# Patient Record
Sex: MALE | Race: WHITE | NOT HISPANIC OR LATINO | URBAN - METROPOLITAN AREA
[De-identification: names, ages, dates, MRNs, and addresses within clinical notes are randomized per-mention and may not be internally consistent; named-entity substitution may affect disease eponyms.]

---

## 2017-05-17 ENCOUNTER — HOSPITAL ENCOUNTER (EMERGENCY)
Facility: HOSPITAL | Age: 42
Discharge: LEFT BEFORE DISCHARGE | End: 2017-05-17
Attending: EMERGENCY MEDICINE | Admitting: EMERGENCY MEDICINE

## 2017-05-17 ENCOUNTER — APPOINTMENT (OUTPATIENT)
Dept: GENERAL RADIOLOGY | Facility: HOSPITAL | Age: 42
End: 2017-05-17

## 2017-05-17 VITALS
OXYGEN SATURATION: 92 % | BODY MASS INDEX: 34.82 KG/M2 | RESPIRATION RATE: 16 BRPM | HEIGHT: 75 IN | HEART RATE: 88 BPM | TEMPERATURE: 98.3 F | DIASTOLIC BLOOD PRESSURE: 66 MMHG | SYSTOLIC BLOOD PRESSURE: 113 MMHG | WEIGHT: 280 LBS

## 2017-05-17 DIAGNOSIS — R79.89 ELEVATED LFTS: ICD-10-CM

## 2017-05-17 DIAGNOSIS — F14.10 COCAINE ABUSE (HCC): ICD-10-CM

## 2017-05-17 DIAGNOSIS — D72.829 LEUKOCYTOSIS, UNSPECIFIED TYPE: ICD-10-CM

## 2017-05-17 DIAGNOSIS — F11.10 HEROIN ABUSE (HCC): ICD-10-CM

## 2017-05-17 DIAGNOSIS — F10.10 ETOH ABUSE: Primary | ICD-10-CM

## 2017-05-17 LAB
ALBUMIN SERPL-MCNC: 4.4 G/DL (ref 3.2–4.8)
ALBUMIN/GLOB SERPL: 1.4 G/DL (ref 1.5–2.5)
ALP SERPL-CCNC: 130 U/L (ref 25–100)
ALT SERPL W P-5'-P-CCNC: 29 U/L (ref 7–40)
AMPHET+METHAMPHET UR QL: NEGATIVE
AMPHETAMINES UR QL: NEGATIVE
ANION GAP SERPL CALCULATED.3IONS-SCNC: 15 MMOL/L (ref 3–11)
AST SERPL-CCNC: 39 U/L (ref 0–33)
BARBITURATES UR QL SCN: NEGATIVE
BASOPHILS # BLD AUTO: 0.05 10*3/MM3 (ref 0–0.2)
BASOPHILS NFR BLD AUTO: 0.3 % (ref 0–1)
BENZODIAZ UR QL SCN: NEGATIVE
BILIRUB SERPL-MCNC: 1.9 MG/DL (ref 0.3–1.2)
BNP SERPL-MCNC: 19 PG/ML (ref 0–100)
BUN BLD-MCNC: 10 MG/DL (ref 9–23)
BUN/CREAT SERPL: 14.3 (ref 7–25)
BUPRENORPHINE SERPL-MCNC: NEGATIVE NG/ML
CALCIUM SPEC-SCNC: 9.4 MG/DL (ref 8.7–10.4)
CANNABINOIDS SERPL QL: NEGATIVE
CHLORIDE SERPL-SCNC: 101 MMOL/L (ref 99–109)
CO2 SERPL-SCNC: 19 MMOL/L (ref 20–31)
COCAINE UR QL: POSITIVE
CREAT BLD-MCNC: 0.7 MG/DL (ref 0.6–1.3)
DEPRECATED RDW RBC AUTO: 47.8 FL (ref 37–54)
EOSINOPHIL # BLD AUTO: 0.02 10*3/MM3 (ref 0.1–0.3)
EOSINOPHIL NFR BLD AUTO: 0.1 % (ref 0–3)
ERYTHROCYTE [DISTWIDTH] IN BLOOD BY AUTOMATED COUNT: 14 % (ref 11.3–14.5)
ETHANOL BLD-MCNC: 235 MG/DL (ref 0–10)
GFR SERPL CREATININE-BSD FRML MDRD: 123 ML/MIN/1.73
GLOBULIN UR ELPH-MCNC: 3.2 GM/DL
GLUCOSE BLD-MCNC: 139 MG/DL (ref 70–100)
HCT VFR BLD AUTO: 46.2 % (ref 38.9–50.9)
HGB BLD-MCNC: 16.3 G/DL (ref 13.1–17.5)
HOLD SPECIMEN: NORMAL
HOLD SPECIMEN: NORMAL
IMM GRANULOCYTES # BLD: 0.03 10*3/MM3 (ref 0–0.03)
IMM GRANULOCYTES NFR BLD: 0.2 % (ref 0–0.6)
INR PPP: 1.05
LIPASE SERPL-CCNC: 35 U/L (ref 6–51)
LYMPHOCYTES # BLD AUTO: 1.66 10*3/MM3 (ref 0.6–4.8)
LYMPHOCYTES NFR BLD AUTO: 11.4 % (ref 24–44)
MCH RBC QN AUTO: 32.9 PG (ref 27–31)
MCHC RBC AUTO-ENTMCNC: 35.3 G/DL (ref 32–36)
MCV RBC AUTO: 93.3 FL (ref 80–99)
METHADONE UR QL SCN: NEGATIVE
MONOCYTES # BLD AUTO: 1.01 10*3/MM3 (ref 0–1)
MONOCYTES NFR BLD AUTO: 6.9 % (ref 0–12)
NEUTROPHILS # BLD AUTO: 11.82 10*3/MM3 (ref 1.5–8.3)
NEUTROPHILS NFR BLD AUTO: 81.1 % (ref 41–71)
OPIATES UR QL: POSITIVE
OXYCODONE UR QL SCN: NEGATIVE
PCP UR QL SCN: NEGATIVE
PLATELET # BLD AUTO: 123 10*3/MM3 (ref 150–450)
PMV BLD AUTO: 10.2 FL (ref 6–12)
POTASSIUM BLD-SCNC: 3.5 MMOL/L (ref 3.5–5.5)
PROPOXYPH UR QL: NEGATIVE
PROT SERPL-MCNC: 7.6 G/DL (ref 5.7–8.2)
PROTHROMBIN TIME: 11.5 SECONDS (ref 9.6–11.5)
RBC # BLD AUTO: 4.95 10*6/MM3 (ref 4.2–5.76)
SODIUM BLD-SCNC: 135 MMOL/L (ref 132–146)
TRICYCLICS UR QL SCN: NEGATIVE
TROPONIN I SERPL-MCNC: 0 NG/ML (ref 0–0.07)
TROPONIN I SERPL-MCNC: 0 NG/ML (ref 0–0.07)
WBC NRBC COR # BLD: 14.59 10*3/MM3 (ref 3.5–10.8)
WHOLE BLOOD HOLD SPECIMEN: NORMAL
WHOLE BLOOD HOLD SPECIMEN: NORMAL

## 2017-05-17 PROCEDURE — 99284 EMERGENCY DEPT VISIT MOD MDM: CPT

## 2017-05-17 PROCEDURE — 83880 ASSAY OF NATRIURETIC PEPTIDE: CPT | Performed by: EMERGENCY MEDICINE

## 2017-05-17 PROCEDURE — 96361 HYDRATE IV INFUSION ADD-ON: CPT

## 2017-05-17 PROCEDURE — 83690 ASSAY OF LIPASE: CPT | Performed by: EMERGENCY MEDICINE

## 2017-05-17 PROCEDURE — 85025 COMPLETE CBC W/AUTO DIFF WBC: CPT | Performed by: EMERGENCY MEDICINE

## 2017-05-17 PROCEDURE — 80306 DRUG TEST PRSMV INSTRMNT: CPT | Performed by: EMERGENCY MEDICINE

## 2017-05-17 PROCEDURE — 96375 TX/PRO/DX INJ NEW DRUG ADDON: CPT

## 2017-05-17 PROCEDURE — 93005 ELECTROCARDIOGRAM TRACING: CPT | Performed by: EMERGENCY MEDICINE

## 2017-05-17 PROCEDURE — 25010000002 ONDANSETRON PER 1 MG: Performed by: EMERGENCY MEDICINE

## 2017-05-17 PROCEDURE — 85610 PROTHROMBIN TIME: CPT | Performed by: EMERGENCY MEDICINE

## 2017-05-17 PROCEDURE — 80307 DRUG TEST PRSMV CHEM ANLYZR: CPT | Performed by: EMERGENCY MEDICINE

## 2017-05-17 PROCEDURE — 84484 ASSAY OF TROPONIN QUANT: CPT

## 2017-05-17 PROCEDURE — 93005 ELECTROCARDIOGRAM TRACING: CPT

## 2017-05-17 PROCEDURE — 80053 COMPREHEN METABOLIC PANEL: CPT | Performed by: EMERGENCY MEDICINE

## 2017-05-17 PROCEDURE — 25010000002 LORAZEPAM PER 2 MG: Performed by: EMERGENCY MEDICINE

## 2017-05-17 PROCEDURE — 96374 THER/PROPH/DIAG INJ IV PUSH: CPT

## 2017-05-17 PROCEDURE — 71010 HC CHEST PA OR AP: CPT

## 2017-05-17 RX ORDER — ONDANSETRON 2 MG/ML
4 INJECTION INTRAMUSCULAR; INTRAVENOUS ONCE
Status: COMPLETED | OUTPATIENT
Start: 2017-05-17 | End: 2017-05-17

## 2017-05-17 RX ORDER — FAMOTIDINE 10 MG/ML
20 INJECTION, SOLUTION INTRAVENOUS ONCE
Status: COMPLETED | OUTPATIENT
Start: 2017-05-17 | End: 2017-05-17

## 2017-05-17 RX ORDER — OMEPRAZOLE 20 MG/1
40 CAPSULE, DELAYED RELEASE ORAL DAILY
COMMUNITY
End: 2017-09-11

## 2017-05-17 RX ORDER — CARBAMAZEPINE 200 MG/1
200 TABLET, EXTENDED RELEASE ORAL 2 TIMES DAILY
Qty: 18 TABLET | Refills: 0 | Status: SHIPPED | OUTPATIENT
Start: 2017-05-17 | End: 2017-09-11

## 2017-05-17 RX ORDER — SODIUM CHLORIDE 0.9 % (FLUSH) 0.9 %
10 SYRINGE (ML) INJECTION AS NEEDED
Status: DISCONTINUED | OUTPATIENT
Start: 2017-05-17 | End: 2017-05-17 | Stop reason: HOSPADM

## 2017-05-17 RX ORDER — LORAZEPAM 2 MG/ML
1 INJECTION INTRAMUSCULAR ONCE
Status: COMPLETED | OUTPATIENT
Start: 2017-05-17 | End: 2017-05-17

## 2017-05-17 RX ADMIN — ONDANSETRON 4 MG: 2 INJECTION INTRAMUSCULAR; INTRAVENOUS at 10:35

## 2017-05-17 RX ADMIN — FAMOTIDINE 20 MG: 10 INJECTION, SOLUTION INTRAVENOUS at 10:37

## 2017-05-17 RX ADMIN — SODIUM CHLORIDE 1000 ML: 9 INJECTION, SOLUTION INTRAVENOUS at 10:25

## 2017-05-17 RX ADMIN — LORAZEPAM 1 MG: 2 INJECTION, SOLUTION INTRAMUSCULAR; INTRAVENOUS at 10:39

## 2017-09-11 ENCOUNTER — OFFICE VISIT (OUTPATIENT)
Dept: FAMILY MEDICINE CLINIC | Facility: CLINIC | Age: 42
End: 2017-09-11

## 2017-09-11 VITALS
BODY MASS INDEX: 34.38 KG/M2 | RESPIRATION RATE: 18 BRPM | TEMPERATURE: 98.4 F | SYSTOLIC BLOOD PRESSURE: 126 MMHG | HEART RATE: 72 BPM | DIASTOLIC BLOOD PRESSURE: 84 MMHG | WEIGHT: 276.5 LBS | HEIGHT: 75 IN

## 2017-09-11 DIAGNOSIS — R20.2 PARESTHESIA OF SKIN: ICD-10-CM

## 2017-09-11 DIAGNOSIS — F10.20 ALCOHOLISM (HCC): ICD-10-CM

## 2017-09-11 DIAGNOSIS — I10 ESSENTIAL HYPERTENSION: ICD-10-CM

## 2017-09-11 DIAGNOSIS — Z76.89 ENCOUNTER TO ESTABLISH CARE WITH NEW DOCTOR: ICD-10-CM

## 2017-09-11 DIAGNOSIS — F31.60 BIPOLAR AFFECTIVE DISORDER, CURRENT EPISODE MIXED, CURRENT EPISODE SEVERITY UNSPECIFIED (HCC): ICD-10-CM

## 2017-09-11 DIAGNOSIS — I26.99 OTHER PULMONARY EMBOLISM WITHOUT ACUTE COR PULMONALE, UNSPECIFIED CHRONICITY (HCC): Primary | ICD-10-CM

## 2017-09-11 DIAGNOSIS — F41.9 ANXIETY: ICD-10-CM

## 2017-09-11 DIAGNOSIS — K21.9 GASTROESOPHAGEAL REFLUX DISEASE, ESOPHAGITIS PRESENCE NOT SPECIFIED: ICD-10-CM

## 2017-09-11 DIAGNOSIS — Z72.0 TOBACCO ABUSE: ICD-10-CM

## 2017-09-11 DIAGNOSIS — Z13.220 SCREENING FOR HYPERLIPIDEMIA: ICD-10-CM

## 2017-09-11 LAB
ALBUMIN SERPL-MCNC: 4.4 G/DL (ref 3.2–4.8)
ALBUMIN/GLOB SERPL: 1.7 G/DL (ref 1.5–2.5)
ALP SERPL-CCNC: 130 U/L (ref 25–100)
ALT SERPL-CCNC: 31 U/L (ref 7–40)
AST SERPL-CCNC: 25 U/L (ref 0–33)
BASOPHILS # BLD AUTO: 0.04 10*3/MM3 (ref 0–0.2)
BASOPHILS NFR BLD AUTO: 0.4 % (ref 0–1)
BILIRUB SERPL-MCNC: 0.7 MG/DL (ref 0.3–1.2)
BUN SERPL-MCNC: 15 MG/DL (ref 9–23)
BUN/CREAT SERPL: 18.8 (ref 7–25)
CALCIUM SERPL-MCNC: 10 MG/DL (ref 8.7–10.4)
CHLORIDE SERPL-SCNC: 104 MMOL/L (ref 99–109)
CHOLEST SERPL-MCNC: 173 MG/DL (ref 0–200)
CO2 SERPL-SCNC: 26 MMOL/L (ref 20–31)
CREAT SERPL-MCNC: 0.8 MG/DL (ref 0.6–1.3)
EOSINOPHIL # BLD AUTO: 0.42 10*3/MM3 (ref 0–0.3)
EOSINOPHIL NFR BLD AUTO: 4.7 % (ref 0–3)
ERYTHROCYTE [DISTWIDTH] IN BLOOD BY AUTOMATED COUNT: 13.7 % (ref 11.3–14.5)
GLOBULIN SER CALC-MCNC: 2.6 GM/DL
GLUCOSE SERPL-MCNC: 98 MG/DL (ref 70–100)
HCT VFR BLD AUTO: 52 % (ref 38.9–50.9)
HDLC SERPL-MCNC: 58 MG/DL (ref 40–60)
HGB BLD-MCNC: 17 G/DL (ref 13.1–17.5)
IMM GRANULOCYTES # BLD: 0.03 10*3/MM3 (ref 0–0.03)
IMM GRANULOCYTES NFR BLD: 0.3 % (ref 0–0.6)
LDLC SERPL CALC-MCNC: 80 MG/DL (ref 0–100)
LYMPHOCYTES # BLD AUTO: 1.8 10*3/MM3 (ref 0.6–4.8)
LYMPHOCYTES NFR BLD AUTO: 20 % (ref 24–44)
MCH RBC QN AUTO: 30.7 PG (ref 27–31)
MCHC RBC AUTO-ENTMCNC: 32.7 G/DL (ref 32–36)
MCV RBC AUTO: 94 FL (ref 80–99)
MONOCYTES # BLD AUTO: 0.84 10*3/MM3 (ref 0–1)
MONOCYTES NFR BLD AUTO: 9.3 % (ref 0–12)
NEUTROPHILS # BLD AUTO: 5.89 10*3/MM3 (ref 1.5–8.3)
NEUTROPHILS NFR BLD AUTO: 65.3 % (ref 41–71)
PLATELET # BLD AUTO: 174 10*3/MM3 (ref 150–450)
POTASSIUM SERPL-SCNC: 4.8 MMOL/L (ref 3.5–5.5)
PROT SERPL-MCNC: 7 G/DL (ref 5.7–8.2)
RBC # BLD AUTO: 5.53 10*6/MM3 (ref 4.2–5.76)
SODIUM SERPL-SCNC: 139 MMOL/L (ref 132–146)
TRIGL SERPL-MCNC: 175 MG/DL (ref 0–150)
VIT B12 SERPL-MCNC: 316 PG/ML (ref 211–911)
VLDLC SERPL CALC-MCNC: 35 MG/DL
WBC # BLD AUTO: 9.02 10*3/MM3 (ref 3.5–10.8)

## 2017-09-11 PROCEDURE — 99406 BEHAV CHNG SMOKING 3-10 MIN: CPT | Performed by: FAMILY MEDICINE

## 2017-09-11 PROCEDURE — 99204 OFFICE O/P NEW MOD 45 MIN: CPT | Performed by: FAMILY MEDICINE

## 2017-09-11 RX ORDER — LISINOPRIL 20 MG/1
TABLET ORAL
COMMUNITY
Start: 2017-08-29 | End: 2017-09-11 | Stop reason: SDUPTHER

## 2017-09-11 RX ORDER — PROPRANOLOL HYDROCHLORIDE 20 MG/1
20 TABLET ORAL 3 TIMES DAILY
Qty: 90 TABLET | Refills: 2 | Status: SHIPPED | OUTPATIENT
Start: 2017-09-11 | End: 2017-12-11 | Stop reason: SDUPTHER

## 2017-09-11 RX ORDER — HYDROXYZINE HYDROCHLORIDE 25 MG/1
TABLET, FILM COATED ORAL
COMMUNITY
Start: 2017-08-29 | End: 2017-09-11 | Stop reason: SDUPTHER

## 2017-09-11 RX ORDER — HYDROXYZINE HYDROCHLORIDE 25 MG/1
25 TABLET, FILM COATED ORAL EVERY 6 HOURS PRN
Qty: 120 TABLET | Refills: 2 | Status: SHIPPED | OUTPATIENT
Start: 2017-09-11 | End: 2018-04-30 | Stop reason: SDUPTHER

## 2017-09-11 RX ORDER — RANITIDINE 150 MG/1
TABLET ORAL
COMMUNITY
Start: 2017-08-29 | End: 2017-09-11 | Stop reason: SDUPTHER

## 2017-09-11 RX ORDER — VENLAFAXINE HYDROCHLORIDE 75 MG/1
75 CAPSULE, EXTENDED RELEASE ORAL DAILY
Qty: 30 CAPSULE | Refills: 2 | Status: SHIPPED | OUTPATIENT
Start: 2017-09-11 | End: 2017-12-11 | Stop reason: SDUPTHER

## 2017-09-11 RX ORDER — QUETIAPINE FUMARATE 50 MG/1
50 TABLET, FILM COATED ORAL NIGHTLY
Qty: 30 TABLET | Refills: 2 | Status: SHIPPED | OUTPATIENT
Start: 2017-09-11 | End: 2017-12-11 | Stop reason: SDUPTHER

## 2017-09-11 RX ORDER — RANITIDINE 150 MG/1
150 TABLET ORAL 2 TIMES DAILY
Qty: 60 TABLET | Refills: 2 | Status: SHIPPED | OUTPATIENT
Start: 2017-09-11 | End: 2017-12-11 | Stop reason: SDUPTHER

## 2017-09-11 RX ORDER — VENLAFAXINE HYDROCHLORIDE 75 MG/1
CAPSULE, EXTENDED RELEASE ORAL
COMMUNITY
Start: 2017-08-29 | End: 2017-09-11 | Stop reason: SDUPTHER

## 2017-09-11 RX ORDER — LISINOPRIL 20 MG/1
20 TABLET ORAL DAILY
Qty: 30 TABLET | Refills: 2 | Status: SHIPPED | OUTPATIENT
Start: 2017-09-11 | End: 2017-12-11 | Stop reason: SDUPTHER

## 2017-09-11 RX ORDER — QUETIAPINE FUMARATE 50 MG/1
TABLET, FILM COATED ORAL
COMMUNITY
Start: 2017-08-29 | End: 2017-09-11 | Stop reason: SDUPTHER

## 2017-09-11 NOTE — PATIENT INSTRUCTIONS
Go to the nearest ER or return to clinic if symptoms worsen, fever/chill develop      Hypertension  Hypertension is another name for high blood pressure. High blood pressure forces your heart to work harder to pump blood. A blood pressure reading has two numbers, which includes a higher number over a lower number (example: 110/72).  HOME CARE   · Have your blood pressure rechecked by your doctor.  · Only take medicine as told by your doctor. Follow the directions carefully. The medicine does not work as well if you skip doses. Skipping doses also puts you at risk for problems.  · Do not smoke.  · Monitor your blood pressure at home as told by your doctor.  GET HELP IF:  · You think you are having a reaction to the medicine you are taking.  · You have repeat headaches or feel dizzy.  · You have puffiness (swelling) in your ankles.  · You have trouble with your vision.  GET HELP RIGHT AWAY IF:   · You get a very bad headache and are confused.  · You feel weak, numb, or faint.  · You get chest or belly (abdominal) pain.  · You throw up (vomit).  · You cannot breathe very well.  MAKE SURE YOU:   · Understand these instructions.  · Will watch your condition.  · Will get help right away if you are not doing well or get worse.     This information is not intended to replace advice given to you by your health care provider. Make sure you discuss any questions you have with your health care provider.     Document Released: 06/05/2009 Document Revised: 12/23/2014 Document Reviewed: 10/10/2014  [employer] Interactive Patient Education ©2017 [employer]

## 2017-09-11 NOTE — PROGRESS NOTES
Subjective   Manolo Muir is a 42 y.o. male.     History of Present Illness   Here to establish care  History of pulmonary embolism, diagnosed 3 months ago at [facility] in [place]. He was treated inpatient there and discharged on Xarelto. He has no previous history of DVT or PE prior to this episode, no hyper coagulopathy.   He took Xarelto x 5 weeks, has been without for several weeks due to medication in car while it was towed.   Was told that he needs to take 3-6 months of therapy.     He is worried about diabetes.   Stays thirsty all the time, paraesthesia of the lower extremities, family history.     He is a , while working his right foot will fall asleep. This is new, only present for 2-4 weeks.   Also, his left lateral thigh stays numb constantly for more than 1 year.   He has history of back injury, fell when he was 13. He has a MRI in [place] years ago. Currently, not having back pain or radiation of symptoms into his lower extremities.     He is a chronic alcoholic, drinks at least 3 beers per day.   He also has a history of heroin abuse, documented ER visit in May 2017 was secondary to alcohol and heroin use. UDS May 2017 also positive for cocaine.     History of tremor in his hands/arms. Currently treated with Propranolol, which does improve symptoms. States that he had tremor prior to becoming an alcoholic.  He was referred to neurology for this condition by previous provider, but didn't go to appointment.     The following portions of the patient's history were reviewed and updated as appropriate: allergies, current medications, past family history, past medical history, past social history, past surgical history and problem list.    Review of Systems   Constitutional: Positive for fatigue. Negative for chills and fever.   HENT: Negative for congestion, ear pain and hearing loss.    Eyes: Negative for photophobia and pain.   Respiratory: Positive for cough and wheezing. Negative for chest  tightness and shortness of breath.    Cardiovascular: Negative for chest pain, palpitations and leg swelling.   Gastrointestinal: Negative for blood in stool, constipation and vomiting.        Heartburn     Endocrine: Positive for polydipsia and polyphagia. Negative for cold intolerance and heat intolerance.   Genitourinary: Negative for difficulty urinating, dysuria and hematuria.   Musculoskeletal: Positive for arthralgias and back pain. Negative for gait problem.   Skin: Negative for color change, rash and wound.   Allergic/Immunologic: Negative for environmental allergies and food allergies.   Neurological: Positive for tremors, weakness and numbness. Negative for dizziness and headaches.   Hematological: Negative for adenopathy. Does not bruise/bleed easily.   Psychiatric/Behavioral: Positive for self-injury and sleep disturbance. Negative for agitation, confusion and suicidal ideas. The patient is nervous/anxious.        Objective   Physical Exam   Constitutional: He is oriented to person, place, and time. He appears well-developed and well-nourished.   HENT:   Head: Normocephalic and atraumatic.   Right Ear: External ear normal.   Left Ear: External ear normal.   Nose: Nose normal.   Mouth/Throat: Oropharynx is clear and moist.   Eyes: Conjunctivae and EOM are normal.   Neck: Normal range of motion. Neck supple. No tracheal deviation present. No thyromegaly present.   Cardiovascular: Normal rate, regular rhythm and normal heart sounds.    No murmur heard.  Pulmonary/Chest: Effort normal and breath sounds normal. No respiratory distress. He has no wheezes.   Abdominal: Soft. Bowel sounds are normal.   Musculoskeletal: He exhibits no edema or deformity.   Lymphadenopathy:     He has no cervical adenopathy.   Neurological: He is alert and oriented to person, place, and time. No cranial nerve deficit.   Skin: Skin is warm and dry. No rash noted.   Psychiatric: He has a normal mood and affect. His behavior is  normal. Judgment and thought content normal.   Nursing note and vitals reviewed.      Assessment/Plan   Manolo was seen today for establish care and pulmonary embolism.    Diagnoses and all orders for this visit:    Other pulmonary embolism without acute cor pulmonale, unspecified chronicity  -     CBC & Differential  -     Comprehensive Metabolic Panel  -     rivaroxaban (XARELTO) 20 MG tablet; Take 1 tablet by mouth Daily.    Essential hypertension  -     CBC & Differential  -     Comprehensive Metabolic Panel  -     lisinopril (PRINIVIL,ZESTRIL) 20 MG tablet; Take 1 tablet by mouth Daily.  -     propranolol (INDERAL) 20 MG tablet; Take 1 tablet by mouth 3 (Three) Times a Day.    Paresthesia of skin  -     CBC & Differential  -     Comprehensive Metabolic Panel  -     Vitamin B12    Gastroesophageal reflux disease, esophagitis presence not specified  -     raNITIdine (ZANTAC) 150 MG tablet; Take 1 tablet by mouth 2 (Two) Times a Day.    Alcoholism  -     CBC & Differential  -     Comprehensive Metabolic Panel  -     Vitamin B12    Anxiety  -     venlafaxine XR (EFFEXOR-XR) 75 MG 24 hr capsule; Take 1 capsule by mouth Daily.  -     hydrOXYzine (ATARAX) 25 MG tablet; Take 1 tablet by mouth Every 6 (Six) Hours As Needed for Itching.    Bipolar affective disorder, current episode mixed, current episode severity unspecified  -     QUEtiapine (SEROquel) 50 MG tablet; Take 1 tablet by mouth Every Night.    Tobacco abuse  -     CBC & Differential  -     Comprehensive Metabolic Panel  -     Lipid Panel    Screening for hyperlipidemia  -     CBC & Differential  -     Comprehensive Metabolic Panel  -     Lipid Panel    Encounter to establish care with new doctor      Check labs for diabetes and low vitamin b12 due to symptoms of neuropathy.   If normal, refer for NCV.   I'm hesitant on prescribing gabapentin or pregabalin due to his history of substance abuse. He is currently treating with Ibuprofen, although it can increase  chances of bleeding in combination with Xarelto.    Resume Xarelto therapy and request inpatient records from [facility].     Medications refilled, other chronic conditions are stable at this time.     I advised the patient of the risks in continuing to use tobacco. The patient has not expressed the willingness to quit.    During this visit, I spent 3-10 mintues counseling the patient regarding smoking cessation.

## 2017-09-12 DIAGNOSIS — R20.2 PARESTHESIA OF SKIN: Primary | ICD-10-CM

## 2017-09-14 ENCOUNTER — OFFICE VISIT (OUTPATIENT)
Dept: FAMILY MEDICINE CLINIC | Facility: CLINIC | Age: 42
End: 2017-09-14

## 2017-09-14 VITALS
BODY MASS INDEX: 34.19 KG/M2 | DIASTOLIC BLOOD PRESSURE: 82 MMHG | HEART RATE: 81 BPM | WEIGHT: 275 LBS | OXYGEN SATURATION: 98 % | RESPIRATION RATE: 16 BRPM | SYSTOLIC BLOOD PRESSURE: 124 MMHG | HEIGHT: 75 IN

## 2017-09-14 DIAGNOSIS — I73.9 CLAUDICATION (HCC): Primary | ICD-10-CM

## 2017-09-14 PROCEDURE — 99213 OFFICE O/P EST LOW 20 MIN: CPT | Performed by: FAMILY MEDICINE

## 2017-09-14 NOTE — PROGRESS NOTES
Subjective   Manolo Muir is a 42 y.o. male.     History of Present Illness   Right foot falls asleep when he is standing. Also, pain with LE when walking, worse on the right side. Numbness of the right foot is new, less than 1 month, but pain associated with standing and walking has been present for 2-3 months.  Symptoms improve with rest, but return when he gets back up for a prolonged period. He starts feeling symptoms after standing for 20 minutes. It is making it difficult for him to work, he is a .   His feet have always stayed cold, but has noticed that the right great toenail has changed colors and became white/blue. He is currently taking Xarelto for hx of PE.   He states that his mother has a history of PVD.   He is also requesting Neurontin to treat the numbness in his foot. I have referred him for NCV of lower extremities, scheduled in Oct 2017.     The following portions of the patient's history were reviewed and updated as appropriate: allergies, current medications, past family history, past medical history, past social history, past surgical history and problem list.    Review of Systems   Constitutional: Negative for chills and fever.   Respiratory: Negative for cough, shortness of breath and wheezing.    Cardiovascular: Negative for chest pain and palpitations.   Musculoskeletal: Positive for gait problem (pain with walking). Negative for back pain.   Skin: Positive for color change.   Neurological: Positive for numbness.       Objective   Physical Exam   Constitutional: He is oriented to person, place, and time. He appears well-developed and well-nourished.   HENT:   Head: Normocephalic and atraumatic.   Nose: Nose normal.   Eyes: Conjunctivae are normal.   Cardiovascular: Normal rate, regular rhythm and normal heart sounds.    Pulses:       Dorsalis pedis pulses are 1+ on the right side, and 2+ on the left side.        Posterior tibial pulses are 2+ on the right side, and 2+ on the left side.    Pulmonary/Chest: Effort normal and breath sounds normal.   Musculoskeletal: He exhibits no edema or deformity.   Neurological: He is alert and oriented to person, place, and time. No cranial nerve deficit.   Skin: Skin is dry.   Dusky appearing tips of right great toe and 2nd toe.   Psychiatric: He has a normal mood and affect. His behavior is normal.   Nursing note and vitals reviewed.      Assessment/Plan   Manolo was seen today for foot pain.    Diagnoses and all orders for this visit:    Claudication  -     Doppler Ankle Brachial Index Multi Level CAR      Concerned for PVD based on his symptoms. I have advised him to go to ER if symptoms worsen.   YUNI ordered to evaluate  He requested that Neurontin be written for the numbness in his right foot. I explained to him that NCV will need to be completed and proof that neuropathy is present before I'll consider providing neurontin.

## 2017-09-14 NOTE — PATIENT INSTRUCTIONS
Go to the nearest ER or return to clinic if symptoms worsen, fever/chill develop      Intermittent Claudication  Intermittent claudication is pain in your leg that occurs when you walk or exercise and goes away when you rest. The pain can occur in one or both legs.  CAUSES  Intermittent claudication is caused by the buildup of plaque within the major arteries in the body (atherosclerosis). The plaque, which makes arteries stiff and narrow, prevents enough blood from reaching your leg muscles. The pain occurs when you walk or exercise because your muscles need more blood when you are moving and exercising.  RISK FACTORS  Risk factors include:  · A family history of atherosclerosis.  · A personal history of stroke or heart disease.  · Older age.  · Being inactive or overweight.  · Smoking cigarettes.  · Having another health condition such as:    Diabetes.    High blood pressure.    High cholesterol.  SIGNS AND SYMPTOMS   Your hip or leg may:   · Ache.  · Cramp.  · Feel tight.  · Feel weak.  · Feel heavy.  Over time, you may feel pain in your calf, thigh, or hip.  DIAGNOSIS   Your health care provider may diagnose intermittent claudication based on your symptoms and medical history. Your health care provider may also do tests to learn more about your condition. These may include:  · Blood tests.  · An ultrasound.  · Imaging tests such as angiography, magnetic resonance angiography (MRA), and computed tomography angiography (CTA).  TREATMENT  You may be treated for problems such as:  · High blood pressure.  · High cholesterol.  · Diabetes.  Other treatments may include:  · Lifestyle changes such as:    Starting an exercise program.    Losing weight.    Quitting smoking.  · Medicines to help restore blood flow through your legs.  · Blood vessel surgery (angioplasty) to restore blood flow if your intermittent claudication is caused by severe peripheral artery disease.  HOME CARE INSTRUCTIONS  · Manage any other health  conditions you have.  · Eat a diet low in saturated fats and calories to maintain a healthy weight.  · Quit smoking, if you smoke.  · Take medicines only as directed by your health care provider.  · If your health care provider recommended an exercise program for you, follow it as directed. Your exercise program may involve:    Walking three or more times a week.    Walking until you have certain symptoms of intermittent claudication.    Resting until symptoms go away.    Gradually increasing walking time to about 50 minutes a day.  SEEK MEDICAL CARE IF:  Your condition is not getting better or is getting worse.  SEEK IMMEDIATE MEDICAL CARE IF:   · You have chest pain.  · You have difficulty breathing.  · You develop arm weakness.  · You have trouble speaking.  · Your face begins to droop.  MAKE SURE YOU:  · Understand these instructions.  · Will watch your condition.  · Will get help if you are not doing well or get worse.     This information is not intended to replace advice given to you by your health care provider. Make sure you discuss any questions you have with your health care provider.     Document Released: 10/20/2005 Document Revised: 01/08/2016 Document Reviewed: 03/26/2015  [employer] Interactive Patient Education ©2017 [employer]

## 2017-10-02 ENCOUNTER — OFFICE VISIT (OUTPATIENT)
Dept: FAMILY MEDICINE CLINIC | Facility: CLINIC | Age: 42
End: 2017-10-02

## 2017-10-02 VITALS
DIASTOLIC BLOOD PRESSURE: 90 MMHG | TEMPERATURE: 99.2 F | BODY MASS INDEX: 35.14 KG/M2 | RESPIRATION RATE: 20 BRPM | HEART RATE: 80 BPM | WEIGHT: 282.6 LBS | HEIGHT: 75 IN | SYSTOLIC BLOOD PRESSURE: 118 MMHG

## 2017-10-02 DIAGNOSIS — J06.9 ACUTE URI: Primary | ICD-10-CM

## 2017-10-02 PROCEDURE — 99213 OFFICE O/P EST LOW 20 MIN: CPT | Performed by: FAMILY MEDICINE

## 2017-10-02 RX ORDER — METHYLPREDNISOLONE 4 MG/1
TABLET ORAL
Qty: 21 EACH | Refills: 0 | Status: SHIPPED | OUTPATIENT
Start: 2017-10-02 | End: 2017-11-15

## 2017-10-02 RX ORDER — AZITHROMYCIN 250 MG/1
TABLET, FILM COATED ORAL
Qty: 6 TABLET | Refills: 0 | Status: SHIPPED | OUTPATIENT
Start: 2017-10-02 | End: 2017-11-15

## 2017-10-02 NOTE — PATIENT INSTRUCTIONS
"Go to the nearest ER or return to clinic if symptoms worsen, fever/chill develop      Upper Respiratory Infection, Adult  Most upper respiratory infections (URIs) are caused by a virus. A URI affects the nose, throat, and upper air passages. The most common type of URI is often called \"the common cold.\"  HOME CARE   · Take medicines only as told by your doctor.  · Gargle warm saltwater or take cough drops to comfort your throat as told by your doctor.  · Use a warm mist humidifier or inhale steam from a shower to increase air moisture. This may make it easier to breathe.  · Drink enough fluid to keep your pee (urine) clear or pale yellow.  · Eat soups and other clear broths.  · Have a healthy diet.  · Rest as needed.  · Go back to work when your fever is gone or your doctor says it is okay.  ¨ You may need to stay home longer to avoid giving your URI to others.  ¨ You can also wear a face mask and wash your hands often to prevent spread of the virus.  · Use your inhaler more if you have asthma.  · Do not use any tobacco products, including cigarettes, chewing tobacco, or electronic cigarettes. If you need help quitting, ask your doctor.  GET HELP IF:  · You are getting worse, not better.  · Your symptoms are not helped by medicine.  · You have chills.  · You are getting more short of breath.  · You have brown or red mucus.  · You have yellow or brown discharge from your nose.  · You have pain in your face, especially when you bend forward.  · You have a fever.  · You have puffy (swollen) neck glands.  · You have pain while swallowing.  · You have white areas in the back of your throat.  GET HELP RIGHT AWAY IF:   · You have very bad or constant:    Headache.    Ear pain.    Pain in your forehead, behind your eyes, and over your cheekbones (sinus pain).    Chest pain.  · You have long-lasting (chronic) lung disease and any of the following:    Wheezing.    Long-lasting cough.    Coughing up blood.    A change in your " usual mucus.  · You have a stiff neck.  · You have changes in your:    Vision.    Hearing.    Thinking.    Mood.  MAKE SURE YOU:   · Understand these instructions.  · Will watch your condition.  · Will get help right away if you are not doing well or get worse.     This information is not intended to replace advice given to you by your health care provider. Make sure you discuss any questions you have with your health care provider.     Document Released: 06/05/2009 Document Revised: 05/03/2016 Document Reviewed: 03/25/2015  [employer] Interactive Patient Education ©2017 [employer]

## 2017-10-02 NOTE — PROGRESS NOTES
Subjective   Manolo Muir is a 42 y.o. male.     Cough   This is a new problem. The current episode started 1 to 4 weeks ago (10 days). The cough is productive of sputum (clear). Associated symptoms include chills, ear congestion, a fever, headaches, nasal congestion, postnasal drip, rhinorrhea and wheezing. Pertinent negatives include no chest pain, ear pain, sore throat or shortness of breath. The symptoms are aggravated by pollens and lying down. Risk factors for lung disease include smoking/tobacco exposure. Treatments tried: Tylenol cold and allergy. The treatment provided moderate relief. His past medical history is significant for environmental allergies.        The following portions of the patient's history were reviewed and updated as appropriate: allergies, current medications, past family history, past medical history, past social history, past surgical history and problem list.    Review of Systems   Constitutional: Positive for chills and fever.   HENT: Positive for congestion, postnasal drip and rhinorrhea. Negative for ear pain and sore throat.    Respiratory: Positive for cough and wheezing. Negative for shortness of breath.    Cardiovascular: Negative for chest pain and palpitations.   Gastrointestinal: Negative for diarrhea, nausea and vomiting.   Allergic/Immunologic: Positive for environmental allergies.   Neurological: Positive for headaches. Negative for dizziness and light-headedness.       Objective   Physical Exam   Constitutional: He is oriented to person, place, and time. He appears well-developed and well-nourished.   HENT:   Head: Normocephalic and atraumatic.   Right Ear: Hearing, tympanic membrane, external ear and ear canal normal.   Left Ear: Hearing, tympanic membrane, external ear and ear canal normal.   Nose: Right sinus exhibits maxillary sinus tenderness and frontal sinus tenderness. Left sinus exhibits maxillary sinus tenderness and frontal sinus tenderness.   Mouth/Throat: Uvula  is midline and mucous membranes are normal. Oropharyngeal exudate present. No posterior oropharyngeal erythema.   Eyes: Conjunctivae and EOM are normal.   Neck: Normal range of motion. Neck supple.   Cardiovascular: Normal rate, regular rhythm and normal heart sounds.    Pulmonary/Chest: Effort normal. No respiratory distress. He has decreased breath sounds in the right upper field, the right middle field, the right lower field, the left upper field, the left middle field and the left lower field. He has no wheezes. He has no rhonchi. He has no rales.   Lymphadenopathy:     He has no cervical adenopathy.   Neurological: He is alert and oriented to person, place, and time. No cranial nerve deficit.   Skin: Skin is warm and dry.   Psychiatric: His behavior is normal.   Nursing note and vitals reviewed.      Assessment/Plan   Problems Addressed this Visit     None      Visit Diagnoses     Acute URI    -  Primary    Relevant Medications    MethylPREDNISolone (MEDROL, SONNY,) 4 MG tablet    azithromycin (ZITHROMAX) 250 MG tablet        Treatment plan above  Follow up if no improvement

## 2017-10-27 ENCOUNTER — HOSPITAL ENCOUNTER (OUTPATIENT)
Dept: NEUROLOGY | Facility: HOSPITAL | Age: 42
Discharge: HOME | End: 2017-10-27
Attending: FAMILY MEDICINE

## 2017-11-15 ENCOUNTER — OFFICE VISIT (OUTPATIENT)
Dept: FAMILY MEDICINE CLINIC | Facility: CLINIC | Age: 42
End: 2017-11-15

## 2017-11-15 VITALS
SYSTOLIC BLOOD PRESSURE: 130 MMHG | BODY MASS INDEX: 36.43 KG/M2 | RESPIRATION RATE: 20 BRPM | DIASTOLIC BLOOD PRESSURE: 90 MMHG | HEART RATE: 84 BPM | HEIGHT: 75 IN | WEIGHT: 293 LBS | TEMPERATURE: 97.8 F

## 2017-11-15 DIAGNOSIS — J06.9 ACUTE URI: Primary | ICD-10-CM

## 2017-11-15 DIAGNOSIS — Z72.0 TOBACCO ABUSE: ICD-10-CM

## 2017-11-15 DIAGNOSIS — R05.9 COUGH: ICD-10-CM

## 2017-11-15 PROCEDURE — 99407 BEHAV CHNG SMOKING > 10 MIN: CPT | Performed by: FAMILY MEDICINE

## 2017-11-15 PROCEDURE — 99213 OFFICE O/P EST LOW 20 MIN: CPT | Performed by: FAMILY MEDICINE

## 2017-11-15 RX ORDER — GUAIFENESIN 600 MG/1
600 TABLET, EXTENDED RELEASE ORAL 2 TIMES DAILY
Qty: 20 TABLET | Refills: 0 | Status: SHIPPED | OUTPATIENT
Start: 2017-11-15 | End: 2017-12-11 | Stop reason: SDUPTHER

## 2017-11-15 RX ORDER — AMOXICILLIN AND CLAVULANATE POTASSIUM 875; 125 MG/1; MG/1
1 TABLET, FILM COATED ORAL 2 TIMES DAILY
Qty: 14 TABLET | Refills: 0 | Status: SHIPPED | OUTPATIENT
Start: 2017-11-15 | End: 2017-11-22

## 2017-11-15 NOTE — PATIENT INSTRUCTIONS
"Go to the nearest ER or return to clinic if symptoms worsen, fever/chill develop      Upper Respiratory Infection, Adult  Most upper respiratory infections (URIs) are caused by a virus. A URI affects the nose, throat, and upper air passages. The most common type of URI is often called \"the common cold.\"  HOME CARE   · Take medicines only as told by your doctor.  · Gargle warm saltwater or take cough drops to comfort your throat as told by your doctor.  · Use a warm mist humidifier or inhale steam from a shower to increase air moisture. This may make it easier to breathe.  · Drink enough fluid to keep your pee (urine) clear or pale yellow.  · Eat soups and other clear broths.  · Have a healthy diet.  · Rest as needed.  · Go back to work when your fever is gone or your doctor says it is okay.  ¨ You may need to stay home longer to avoid giving your URI to others.  ¨ You can also wear a face mask and wash your hands often to prevent spread of the virus.  · Use your inhaler more if you have asthma.  · Do not use any tobacco products, including cigarettes, chewing tobacco, or electronic cigarettes. If you need help quitting, ask your doctor.  GET HELP IF:  · You are getting worse, not better.  · Your symptoms are not helped by medicine.  · You have chills.  · You are getting more short of breath.  · You have brown or red mucus.  · You have yellow or brown discharge from your nose.  · You have pain in your face, especially when you bend forward.  · You have a fever.  · You have puffy (swollen) neck glands.  · You have pain while swallowing.  · You have white areas in the back of your throat.  GET HELP RIGHT AWAY IF:   · You have very bad or constant:    Headache.    Ear pain.    Pain in your forehead, behind your eyes, and over your cheekbones (sinus pain).    Chest pain.  · You have long-lasting (chronic) lung disease and any of the following:    Wheezing.    Long-lasting cough.    Coughing up blood.    A change in your " usual mucus.  · You have a stiff neck.  · You have changes in your:    Vision.    Hearing.    Thinking.    Mood.  MAKE SURE YOU:   · Understand these instructions.  · Will watch your condition.  · Will get help right away if you are not doing well or get worse.     This information is not intended to replace advice given to you by your health care provider. Make sure you discuss any questions you have with your health care provider.     Document Released: 06/05/2009 Document Revised: 05/03/2016 Document Reviewed: 03/25/2015  [employer] Interactive Patient Education ©2017 [employer]

## 2017-11-15 NOTE — PROGRESS NOTES
Subjective   Manolo Muir is a 42 y.o. male.     Cough   This is a new problem. The current episode started in the past 7 days. The problem has been gradually worsening. The problem occurs constantly. The cough is productive of purulent sputum. Associated symptoms include ear congestion, headaches, nasal congestion, postnasal drip and rhinorrhea. Pertinent negatives include no chest pain, chills, ear pain, fever, hemoptysis, sore throat, shortness of breath or wheezing. The symptoms are aggravated by lying down. Risk factors for lung disease include smoking/tobacco exposure. He has tried OTC cough suppressant for the symptoms. The treatment provided no relief. There is no history of asthma.        The following portions of the patient's history were reviewed and updated as appropriate: allergies, current medications, past family history, past medical history, past social history, past surgical history and problem list.    Review of Systems   Constitutional: Negative for chills and fever.   HENT: Positive for congestion, postnasal drip, rhinorrhea, sinus pain and sinus pressure. Negative for ear pain and sore throat.    Respiratory: Positive for cough. Negative for hemoptysis, shortness of breath and wheezing.    Cardiovascular: Negative for chest pain and palpitations.   Neurological: Positive for headaches. Negative for dizziness.       Objective   Physical Exam   Constitutional: He is oriented to person, place, and time. He appears well-developed and well-nourished.   HENT:   Head: Normocephalic and atraumatic.   Right Ear: Hearing, external ear and ear canal normal. A middle ear effusion is present.   Left Ear: Hearing, external ear and ear canal normal. A middle ear effusion is present.   Nose: Mucosal edema and rhinorrhea present. Right sinus exhibits maxillary sinus tenderness and frontal sinus tenderness. Left sinus exhibits maxillary sinus tenderness and frontal sinus tenderness.   Mouth/Throat: Uvula is  midline and mucous membranes are normal. Oropharyngeal exudate present.   Eyes: Conjunctivae and EOM are normal.   Neck: Normal range of motion. Neck supple.   Cardiovascular: Normal rate, regular rhythm and normal heart sounds.    Pulmonary/Chest: Effort normal and breath sounds normal. He has no wheezes. He has no rhonchi.   Musculoskeletal: He exhibits no deformity.   Lymphadenopathy:     He has no cervical adenopathy.   Neurological: He is alert and oriented to person, place, and time. No cranial nerve deficit.   Skin: Skin is warm and dry.   Psychiatric: He has a normal mood and affect. His behavior is normal.   Nursing note and vitals reviewed.      Assessment/Plan   Manolo was seen today for cough & congestion.    Diagnoses and all orders for this visit:    Acute URI  -     amoxicillin-clavulanate (AUGMENTIN) 875-125 MG per tablet; Take 1 tablet by mouth 2 (Two) Times a Day for 7 days.  -     PredniSONE 5 MG tablet therapy pack dosepak; Take as directed on package instructions.  -     guaiFENesin (MUCINEX) 600 MG 12 hr tablet; Take 1 tablet by mouth 2 (Two) Times a Day.    Cough  -     amoxicillin-clavulanate (AUGMENTIN) 875-125 MG per tablet; Take 1 tablet by mouth 2 (Two) Times a Day for 7 days.  -     PredniSONE 5 MG tablet therapy pack dosepak; Take as directed on package instructions.  -     guaiFENesin (MUCINEX) 600 MG 12 hr tablet; Take 1 tablet by mouth 2 (Two) Times a Day.    Tobacco abuse      Treatment plan above, follow-up if no improvement    I advised the patient of the risks in continuing to use tobacco, the patient has not expressed the willingness to quit.    During this visit, I spent greater than 10 minutes counseling the patient regarding smoking cessation.

## 2017-12-11 ENCOUNTER — OFFICE VISIT (OUTPATIENT)
Dept: FAMILY MEDICINE CLINIC | Facility: CLINIC | Age: 42
End: 2017-12-11

## 2017-12-11 VITALS
HEIGHT: 75 IN | HEART RATE: 72 BPM | WEIGHT: 292.2 LBS | TEMPERATURE: 97.9 F | DIASTOLIC BLOOD PRESSURE: 90 MMHG | BODY MASS INDEX: 36.33 KG/M2 | SYSTOLIC BLOOD PRESSURE: 118 MMHG | RESPIRATION RATE: 20 BRPM

## 2017-12-11 DIAGNOSIS — F41.9 ANXIETY: ICD-10-CM

## 2017-12-11 DIAGNOSIS — I10 ESSENTIAL HYPERTENSION: Primary | ICD-10-CM

## 2017-12-11 DIAGNOSIS — R20.2 PARESTHESIA OF SKIN: ICD-10-CM

## 2017-12-11 DIAGNOSIS — K21.9 GASTROESOPHAGEAL REFLUX DISEASE, ESOPHAGITIS PRESENCE NOT SPECIFIED: ICD-10-CM

## 2017-12-11 DIAGNOSIS — F31.60 BIPOLAR AFFECTIVE DISORDER, CURRENT EPISODE MIXED, CURRENT EPISODE SEVERITY UNSPECIFIED (HCC): ICD-10-CM

## 2017-12-11 DIAGNOSIS — Z72.0 TOBACCO ABUSE: ICD-10-CM

## 2017-12-11 DIAGNOSIS — Z86.711 HISTORY OF PULMONARY EMBOLISM: ICD-10-CM

## 2017-12-11 DIAGNOSIS — R25.1 TREMOR: ICD-10-CM

## 2017-12-11 DIAGNOSIS — J06.9 PROTRACTED URI: ICD-10-CM

## 2017-12-11 PROCEDURE — 99214 OFFICE O/P EST MOD 30 MIN: CPT | Performed by: FAMILY MEDICINE

## 2017-12-11 RX ORDER — QUETIAPINE FUMARATE 50 MG/1
50 TABLET, FILM COATED ORAL NIGHTLY
Qty: 30 TABLET | Refills: 5 | Status: SHIPPED | OUTPATIENT
Start: 2017-12-11 | End: 2018-11-12 | Stop reason: SDUPTHER

## 2017-12-11 RX ORDER — PROPRANOLOL HYDROCHLORIDE 20 MG/1
20 TABLET ORAL 3 TIMES DAILY
Qty: 90 TABLET | Refills: 5 | Status: SHIPPED | OUTPATIENT
Start: 2017-12-11 | End: 2018-11-02 | Stop reason: SDUPTHER

## 2017-12-11 RX ORDER — VENLAFAXINE HYDROCHLORIDE 75 MG/1
75 CAPSULE, EXTENDED RELEASE ORAL DAILY
Qty: 30 CAPSULE | Refills: 5 | Status: SHIPPED | OUTPATIENT
Start: 2017-12-11 | End: 2018-11-12 | Stop reason: SDUPTHER

## 2017-12-11 RX ORDER — LISINOPRIL 20 MG/1
20 TABLET ORAL DAILY
Qty: 30 TABLET | Refills: 5 | Status: SHIPPED | OUTPATIENT
Start: 2017-12-11 | End: 2018-11-02 | Stop reason: SDUPTHER

## 2017-12-11 RX ORDER — GUAIFENESIN 600 MG/1
600 TABLET, EXTENDED RELEASE ORAL 2 TIMES DAILY
Qty: 20 TABLET | Refills: 0 | Status: SHIPPED | OUTPATIENT
Start: 2017-12-11 | End: 2018-11-12

## 2017-12-11 RX ORDER — LEVOFLOXACIN 750 MG/1
750 TABLET ORAL DAILY
Qty: 7 TABLET | Refills: 0 | Status: SHIPPED | OUTPATIENT
Start: 2017-12-11 | End: 2017-12-18

## 2017-12-11 RX ORDER — RANITIDINE 150 MG/1
150 TABLET ORAL 2 TIMES DAILY
Qty: 60 TABLET | Refills: 5 | Status: SHIPPED | OUTPATIENT
Start: 2017-12-11 | End: 2018-11-12 | Stop reason: SDUPTHER

## 2017-12-11 NOTE — PATIENT INSTRUCTIONS
"Go to the nearest ER or return to clinic if symptoms worsen, fever/chill develop      Upper Respiratory Infection, Adult  Most upper respiratory infections (URIs) are caused by a virus. A URI affects the nose, throat, and upper air passages. The most common type of URI is often called \"the common cold.\"  HOME CARE   · Take medicines only as told by your doctor.  · Gargle warm saltwater or take cough drops to comfort your throat as told by your doctor.  · Use a warm mist humidifier or inhale steam from a shower to increase air moisture. This may make it easier to breathe.  · Drink enough fluid to keep your pee (urine) clear or pale yellow.  · Eat soups and other clear broths.  · Have a healthy diet.  · Rest as needed.  · Go back to work when your fever is gone or your doctor says it is okay.  ¨ You may need to stay home longer to avoid giving your URI to others.  ¨ You can also wear a face mask and wash your hands often to prevent spread of the virus.  · Use your inhaler more if you have asthma.  · Do not use any tobacco products, including cigarettes, chewing tobacco, or electronic cigarettes. If you need help quitting, ask your doctor.  GET HELP IF:  · You are getting worse, not better.  · Your symptoms are not helped by medicine.  · You have chills.  · You are getting more short of breath.  · You have brown or red mucus.  · You have yellow or brown discharge from your nose.  · You have pain in your face, especially when you bend forward.  · You have a fever.  · You have puffy (swollen) neck glands.  · You have pain while swallowing.  · You have white areas in the back of your throat.  GET HELP RIGHT AWAY IF:   · You have very bad or constant:    Headache.    Ear pain.    Pain in your forehead, behind your eyes, and over your cheekbones (sinus pain).    Chest pain.  · You have long-lasting (chronic) lung disease and any of the following:    Wheezing.    Long-lasting cough.    Coughing up blood.    A change in your " usual mucus.  · You have a stiff neck.  · You have changes in your:    Vision.    Hearing.    Thinking.    Mood.  MAKE SURE YOU:   · Understand these instructions.  · Will watch your condition.  · Will get help right away if you are not doing well or get worse.     This information is not intended to replace advice given to you by your health care provider. Make sure you discuss any questions you have with your health care provider.     Document Released: 06/05/2009 Document Revised: 05/03/2016 Document Reviewed: 03/25/2015  [employer] Interactive Patient Education ©2017 [employer]

## 2017-12-11 NOTE — PROGRESS NOTES
Subjective   Manolo Muir is a 42 y.o. male.     Hypertension   This is a chronic problem. The current episode started more than 1 year ago. The problem is unchanged. The problem is controlled. Pertinent negatives include no anxiety, chest pain, headaches, palpitations, peripheral edema or shortness of breath. There are no associated agents to hypertension. Risk factors for coronary artery disease include male gender, smoking/tobacco exposure, sedentary lifestyle and obesity. Past treatments include ACE inhibitors. The current treatment provides significant improvement. Compliance problems include exercise.  There is no history of angina, CVA or heart failure. There is no history of chronic renal disease.      He states that he has a history of tremor, worse in the right hand. He was told that it might be Parkinson's Disease by previous PCP. He was scheduled with a neurologist for evaluation, however didn't keep that appointment. He is requesting to have this evaluated. He currently takes Propranolol to treat the tremor. He reports that he trips all the time. He has numbness and tingling in his lower extremities. I referred him for NCV, however he had to reschedule the appointment for next week.     He is still having head and chest congestion. Cough is sometimes productive. +sore throat, +sinus pressure and pain, +PND   Denies fever, chills, nausea, vomiting   He has been treated with Zpak and Augmentin without resolution.     History of PE, currently treated with Xarelto. This is the second PE he has had, unprovoked. Denies family history. He hasn't had any labs completed to evaluate hypercoagulable state.     The following portions of the patient's history were reviewed and updated as appropriate: allergies, current medications, past family history, past medical history, past social history, past surgical history and problem list.    Review of Systems   Constitutional: Positive for fatigue. Negative for chills and  fever.   HENT: Positive for congestion, ear pain, postnasal drip, sinus pressure and sore throat. Negative for ear discharge.    Eyes: Negative.    Respiratory: Positive for cough. Negative for shortness of breath.    Cardiovascular: Negative for chest pain, palpitations and leg swelling.   Gastrointestinal: Negative for abdominal pain, nausea and vomiting.   Musculoskeletal: Negative for gait problem.   Neurological: Positive for tremors. Negative for dizziness and headaches.   Psychiatric/Behavioral: Negative.        Objective   Physical Exam   Constitutional: He is oriented to person, place, and time. He appears well-developed and well-nourished.   HENT:   Head: Normocephalic and atraumatic.   Right Ear: Hearing, tympanic membrane, external ear and ear canal normal.   Left Ear: Hearing, tympanic membrane, external ear and ear canal normal.   Nose: Mucosal edema present. Right sinus exhibits maxillary sinus tenderness and frontal sinus tenderness. Left sinus exhibits maxillary sinus tenderness and frontal sinus tenderness.   Mouth/Throat: Uvula is midline and mucous membranes are normal. Oropharyngeal exudate present. No posterior oropharyngeal erythema.   Eyes: Conjunctivae are normal.   Neck: Normal range of motion. Neck supple.   Cardiovascular: Normal rate, regular rhythm and normal heart sounds.    Pulmonary/Chest: Effort normal and breath sounds normal. He has no wheezes.   Musculoskeletal: He exhibits no edema or deformity.   Lymphadenopathy:     He has cervical adenopathy.   Neurological: He is alert and oriented to person, place, and time. No cranial nerve deficit.   Skin: Skin is warm and dry.   Psychiatric: He has a normal mood and affect. His behavior is normal.   Nursing note and vitals reviewed.      Assessment/Plan   Manolo was seen today for follow-up and med refill.    Diagnoses and all orders for this visit:    Essential hypertension  -     propranolol (INDERAL) 20 MG tablet; Take 1 tablet by mouth  3 (Three) Times a Day.  -     lisinopril (PRINIVIL,ZESTRIL) 20 MG tablet; Take 1 tablet by mouth Daily.    Tremor  -     Ambulatory Referral to Neurology    Paresthesia of skin  -     Ambulatory Referral to Neurology    Protracted URI  -     levoFLOXacin (LEVAQUIN) 750 MG tablet; Take 1 tablet by mouth Daily for 7 days.  -     guaiFENesin (MUCINEX) 600 MG 12 hr tablet; Take 1 tablet by mouth 2 (Two) Times a Day.    Bipolar affective disorder, current episode mixed, current episode severity unspecified  -     QUEtiapine (SEROquel) 50 MG tablet; Take 1 tablet by mouth Every Night.    Gastroesophageal reflux disease, esophagitis presence not specified  -     raNITIdine (ZANTAC) 150 MG tablet; Take 1 tablet by mouth 2 (Two) Times a Day.    Anxiety  -     venlafaxine XR (EFFEXOR-XR) 75 MG 24 hr capsule; Take 1 capsule by mouth Daily.    Tobacco abuse    History of pulmonary embolism  -     APTT  -     Protime-INR  -     Protein C Deficiency Profile  -     Protein S, Total & Free  -     Lupus Anticoagulant  -     Phosphatidylserine Antibodies  -     Homocysteine  -     Factor 5 Leiden  -     Factor 8 Activity    Other orders  -     rivaroxaban (XARELTO) 20 MG tablet; Take 1 tablet by mouth Daily.    Levaquin to treat ongoing URI.   I advised the patient of the risks in continuing to use tobacco, the patient has not expressed the willingness to quit.    During this visit, I spent 3-10 mintues counseling the patient regarding smoking cessation.       Labs completed to evaluate hypercoagulability  Bipolar and Anxiety are stable, medications refilled.     GERD stable, Zantac refilled.   Avoid caffeine, alcohol, tobacco, and spicy/greasy foods.  Sleep with the head of the bed slightly elevated to decrease reflux symptoms at night.  Avoid eating 3-4 hours prior to bedtime.      Keep appt for NCV. Referred to neurology per his request to evaluate tremor.

## 2017-12-15 LAB
APTT PPP: 31 SEC (ref 24–33)
F5 GENE MUT ANL BLD/T: NORMAL
FACT VIII ACT/NOR PPP: 89 % (ref 57–163)
FACTOR V COMMENT: NORMAL
HCYS SERPL-SCNC: 30.6 UMOL/L (ref 0–15)
INR PPP: 1 (ref 0.8–1.2)
LA NT DPL PPP: 46.9 SEC (ref 0–55)
LA NT DPL/LA NT HPL PPP-RTO: 1 RATIO (ref 0–1.4)
LA PPP-IMP: ABNORMAL
MIXING DRVVT: 45.6 SEC (ref 0–47)
PROT C ACT/NOR PPP: 84 % (ref 73–180)
PROT C AG ACT/NOR PPP IA: 80 % (ref 60–150)
PROT S AG ACT/NOR PPP IA: 84 % (ref 60–150)
PROT S FREE AG ACT/NOR PPP IA: 135 % (ref 57–157)
PROTHROMBIN TIME: 10.7 SEC (ref 9.1–12)
PS IGA SER-ACNC: 7 APS IGA (ref 0–20)
PS IGG SER-ACNC: 1 GPS IGG (ref 0–11)
PS IGM SER-ACNC: 4 MPS IGM (ref 0–25)
SCREEN APTT: 36.1 SEC (ref 0–51.9)
SCREEN DRVVT: 60.4 SEC (ref 0–47)
THROMBIN TIME: 21.6 SEC (ref 0–23)

## 2017-12-18 DIAGNOSIS — R79.89 ELEVATED HOMOCYSTEINE: Primary | ICD-10-CM

## 2017-12-18 DIAGNOSIS — Z86.711 HISTORY OF PULMONARY EMBOLUS (PE): ICD-10-CM

## 2018-02-19 RX ORDER — HYDROXYZINE HYDROCHLORIDE 25 MG/1
TABLET, FILM COATED ORAL
Qty: 120 TABLET | Refills: 0 | Status: SHIPPED | OUTPATIENT
Start: 2018-02-19 | End: 2018-04-30 | Stop reason: SDUPTHER

## 2018-04-30 RX ORDER — HYDROXYZINE HYDROCHLORIDE 25 MG/1
25 TABLET, FILM COATED ORAL EVERY 6 HOURS PRN
Qty: 120 TABLET | Refills: 0 | Status: SHIPPED | OUTPATIENT
Start: 2018-04-30 | End: 2018-11-12 | Stop reason: SDUPTHER

## 2018-11-02 ENCOUNTER — TELEPHONE (OUTPATIENT)
Dept: FAMILY MEDICINE CLINIC | Facility: CLINIC | Age: 43
End: 2018-11-02

## 2018-11-02 DIAGNOSIS — I10 ESSENTIAL HYPERTENSION: ICD-10-CM

## 2018-11-02 RX ORDER — LISINOPRIL 20 MG/1
20 TABLET ORAL DAILY
Qty: 7 TABLET | Refills: 0 | Status: SHIPPED | OUTPATIENT
Start: 2018-11-02 | End: 2018-11-12 | Stop reason: SDUPTHER

## 2018-11-02 RX ORDER — PROPRANOLOL HYDROCHLORIDE 20 MG/1
20 TABLET ORAL 3 TIMES DAILY
Qty: 21 TABLET | Refills: 0 | Status: SHIPPED | OUTPATIENT
Start: 2018-11-02 | End: 2018-11-12 | Stop reason: SDUPTHER

## 2018-11-02 NOTE — TELEPHONE ENCOUNTER
----- Message from Yamilet Moya sent at 11/2/2018  4:25 PM EDT -----  Contact: DR PEPPER PT   CALL FROM PT STATING HE NEEDS A REFILL ON MEDS. HE IS UNSURE OF THE NAMES. HE STATES HE REALLY NEEDS THE BP MEDS AND IS UNSURE IF HE NEEDS TO BE SEEN BEFORE THESE CAN BE FILLED   PT STATES THAT HE IS NOW USING THE [facility] IN [place]   CALL BACK NUMBER FOR [phone].   PT DID MAKE AN APPT TO COME IN AND BE SEEN NEXT WEEK.

## 2018-11-02 NOTE — TELEPHONE ENCOUNTER
LM for pt to call us back. I need to know exactly what meds need to be sent in. Due to the fact it was Friday at 4:45, I did go ahead and send in 1 wk supply of Propranolol and Lisinopril.  The number left in the phone message, was for someone named Marco. Need updated phone number in computer.

## 2018-11-02 NOTE — TELEPHONE ENCOUNTER
----- Message from Yamilet Moya sent at 11/2/2018  4:25 PM EDT -----  Contact: DR PEPPER PT   CALL FROM PT STATING HE NEEDS A REFILL ON MEDS. HE IS UNSURE OF THE NAMES. HE STATES HE REALLY NEEDS THE BP MEDS AND IS UNSURE IF HE NEEDS TO BE SEEN BEFORE THESE CAN BE FILLED   PT STATES THAT HE IS NOW USING THE [employer] [employer] IN [place]   CALL BACK NUMBER FOR [phone].   PT DID MAKE AN APPT TO COME IN AND BE SEEN NEXT WEEK.

## 2018-11-12 ENCOUNTER — OFFICE VISIT (OUTPATIENT)
Dept: FAMILY MEDICINE CLINIC | Facility: CLINIC | Age: 43
End: 2018-11-12

## 2018-11-12 VITALS
HEART RATE: 94 BPM | WEIGHT: 281 LBS | SYSTOLIC BLOOD PRESSURE: 112 MMHG | RESPIRATION RATE: 20 BRPM | DIASTOLIC BLOOD PRESSURE: 82 MMHG | BODY MASS INDEX: 35.12 KG/M2 | TEMPERATURE: 97 F

## 2018-11-12 DIAGNOSIS — K21.9 GASTROESOPHAGEAL REFLUX DISEASE, ESOPHAGITIS PRESENCE NOT SPECIFIED: ICD-10-CM

## 2018-11-12 DIAGNOSIS — F31.60 BIPOLAR AFFECTIVE DISORDER, CURRENT EPISODE MIXED, CURRENT EPISODE SEVERITY UNSPECIFIED (HCC): ICD-10-CM

## 2018-11-12 DIAGNOSIS — F41.9 ANXIETY: ICD-10-CM

## 2018-11-12 DIAGNOSIS — F11.91 HISTORY OF HEROIN USE: ICD-10-CM

## 2018-11-12 DIAGNOSIS — M79.641 BILATERAL HAND PAIN: ICD-10-CM

## 2018-11-12 DIAGNOSIS — I10 ESSENTIAL HYPERTENSION: ICD-10-CM

## 2018-11-12 DIAGNOSIS — F19.91 HISTORY OF DRUG USE: ICD-10-CM

## 2018-11-12 DIAGNOSIS — M79.642 BILATERAL HAND PAIN: ICD-10-CM

## 2018-11-12 DIAGNOSIS — E78.1 HIGH TRIGLYCERIDES: Primary | ICD-10-CM

## 2018-11-12 DIAGNOSIS — Z86.711 HISTORY OF PULMONARY EMBOLISM: ICD-10-CM

## 2018-11-12 DIAGNOSIS — R25.1 TREMOR: ICD-10-CM

## 2018-11-12 DIAGNOSIS — R20.2 PARESTHESIA OF SKIN: ICD-10-CM

## 2018-11-12 DIAGNOSIS — R79.89 ELEVATED HOMOCYSTEINE: ICD-10-CM

## 2018-11-12 DIAGNOSIS — J01.40 ACUTE NON-RECURRENT PANSINUSITIS: ICD-10-CM

## 2018-11-12 PROCEDURE — 99215 OFFICE O/P EST HI 40 MIN: CPT | Performed by: FAMILY MEDICINE

## 2018-11-12 RX ORDER — AMOXICILLIN AND CLAVULANATE POTASSIUM 875; 125 MG/1; MG/1
1 TABLET, FILM COATED ORAL 2 TIMES DAILY
Qty: 20 TABLET | Refills: 0 | Status: SHIPPED | OUTPATIENT
Start: 2018-11-12 | End: 2019-02-04

## 2018-11-12 RX ORDER — PROPRANOLOL HYDROCHLORIDE 20 MG/1
20 TABLET ORAL 3 TIMES DAILY
Qty: 90 TABLET | Refills: 1 | Status: SHIPPED | OUTPATIENT
Start: 2018-11-12 | End: 2019-02-04 | Stop reason: SDUPTHER

## 2018-11-12 RX ORDER — LISINOPRIL 20 MG/1
20 TABLET ORAL DAILY
Qty: 7 TABLET | Refills: 0 | Status: SHIPPED | OUTPATIENT
Start: 2018-11-12 | End: 2018-12-19 | Stop reason: SDUPTHER

## 2018-11-12 RX ORDER — VENLAFAXINE HYDROCHLORIDE 75 MG/1
75 CAPSULE, EXTENDED RELEASE ORAL DAILY
Qty: 30 CAPSULE | Refills: 1 | Status: SHIPPED | OUTPATIENT
Start: 2018-11-12 | End: 2019-02-04 | Stop reason: SDUPTHER

## 2018-11-12 RX ORDER — QUETIAPINE FUMARATE 50 MG/1
50 TABLET, FILM COATED ORAL NIGHTLY
Qty: 30 TABLET | Refills: 1 | Status: SHIPPED | OUTPATIENT
Start: 2018-11-12 | End: 2019-02-04 | Stop reason: SDUPTHER

## 2018-11-12 RX ORDER — RANITIDINE 150 MG/1
150 TABLET ORAL 2 TIMES DAILY
Qty: 60 TABLET | Refills: 1 | Status: SHIPPED | OUTPATIENT
Start: 2018-11-12 | End: 2019-02-04 | Stop reason: SDUPTHER

## 2018-11-12 RX ORDER — HYDROXYZINE HYDROCHLORIDE 25 MG/1
25 TABLET, FILM COATED ORAL EVERY 6 HOURS PRN
Qty: 120 TABLET | Refills: 0 | Status: SHIPPED | OUTPATIENT
Start: 2018-11-12 | End: 2018-11-19 | Stop reason: SDUPTHER

## 2018-11-12 NOTE — PATIENT INSTRUCTIONS
Go to the nearest ER or return to clinic if symptoms worsen, fever/chill develop      Hypertension  Hypertension is another name for high blood pressure. High blood pressure forces your heart to work harder to pump blood. This can cause problems over time.  There are two numbers in a blood pressure reading. There is a top number (systolic) over a bottom number (diastolic). It is best to have a blood pressure below 120/80. Healthy choices can help lower your blood pressure. You may need medicine to help lower your blood pressure if:  · Your blood pressure cannot be lowered with healthy choices.  · Your blood pressure is higher than 130/80.    Follow these instructions at home:  Eating and drinking  · If directed, follow the DASH eating plan. This diet includes:  ? Filling half of your plate at each meal with fruits and vegetables.  ? Filling one quarter of your plate at each meal with whole grains. Whole grains include whole wheat pasta, brown rice, and whole grain bread.  ? Eating or drinking low-fat dairy products, such as skim milk or low-fat yogurt.  ? Filling one quarter of your plate at each meal with low-fat (lean) proteins. Low-fat proteins include fish, skinless chicken, eggs, beans, and tofu.  ? Avoiding fatty meat, cured and processed meat, or chicken with skin.  ? Avoiding premade or processed food.  · Eat less than 1,500 mg of salt (sodium) a day.  · Limit alcohol use to no more than 1 drink a day for nonpregnant women and 2 drinks a day for men. One drink equals 12 oz of beer, 5 oz of wine, or 1½ oz of hard liquor.  Lifestyle  · Work with your doctor to stay at a healthy weight or to lose weight. Ask your doctor what the best weight is for you.  · Get at least 30 minutes of exercise that causes your heart to beat faster (aerobic exercise) most days of the week. This may include walking, swimming, or biking.  · Get at least 30 minutes of exercise that strengthens your muscles (resistance exercise) at  least 3 days a week. This may include lifting weights or pilates.  · Do not use any products that contain nicotine or tobacco. This includes cigarettes and e-cigarettes. If you need help quitting, ask your doctor.  · Check your blood pressure at home as told by your doctor.  · Keep all follow-up visits as told by your doctor. This is important.  Medicines  · Take over-the-counter and prescription medicines only as told by your doctor. Follow directions carefully.  · Do not skip doses of blood pressure medicine. The medicine does not work as well if you skip doses. Skipping doses also puts you at risk for problems.  · Ask your doctor about side effects or reactions to medicines that you should watch for.  Contact a doctor if:  · You think you are having a reaction to the medicine you are taking.  · You have headaches that keep coming back (recurring).  · You feel dizzy.  · You have swelling in your ankles.  · You have trouble with your vision.  Get help right away if:  · You get a very bad headache.  · You start to feel confused.  · You feel weak or numb.  · You feel faint.  · You get very bad pain in your:  ? Chest.  ? Belly (abdomen).  · You throw up (vomit) more than once.  · You have trouble breathing.  Summary  · Hypertension is another name for high blood pressure.  · Making healthy choices can help lower blood pressure. If your blood pressure cannot be controlled with healthy choices, you may need to take medicine.  This information is not intended to replace advice given to you by your health care provider. Make sure you discuss any questions you have with your health care provider.  Document Released: 06/05/2009 Document Revised: 11/15/2017 Document Reviewed: 11/15/2017  [employer] Interactive Patient Education © 2018 [employer]

## 2018-11-12 NOTE — PROGRESS NOTES
Subjective   Manolo Muir is a 43 y.o. male.   Chief Complaint   Patient presents with   • Follow-up     hypertension, anxiety, sleep   • Cough     sorethroat, nasal congestion x2 weeks   • Hand Pain     bilateral x months becomming constant      Hypertension   This is a chronic problem. The current episode started more than 1 year ago. The problem is controlled. Pertinent negatives include no anxiety, chest pain, palpitations or shortness of breath. There are no associated agents to hypertension. Risk factors for coronary artery disease include male gender, obesity, smoking/tobacco exposure and sedentary lifestyle. Past treatments include ACE inhibitors and beta blockers. Current antihypertension treatment includes ACE inhibitors and beta blockers. The current treatment provides significant improvement. Compliance problems include exercise and diet.    URI    This is a new problem. The current episode started 1 to 4 weeks ago. The problem has been gradually worsening. There has been no fever. Associated symptoms include congestion, coughing (dry), a plugged ear sensation, rhinorrhea and a sore throat. Pertinent negatives include no chest pain. He has tried decongestant for the symptoms. The treatment provided no relief.      He has a history of tremor, worse in the right hand. His previous PCP told him that it might be Parkinson's Disease. I referred him to neurology, which he no showed the appointment. States that he was incarcerated for a period, which is why he was unable to make scheduled appointments. He currently takes Propranolol to treat the tremor.      Bilateral hand pain, chronic and getting worse.   Symptoms are worsened by excessive use. He is a , lots of repetitive motion with hands.   Some numbness at night.   Nothing improves symptoms. Has tried NSAID and tylenol, little improvement.   +weakness in  strength, right is worse than left.   Some edema across MCP joints.     He is requesting  refills on all medications. Labs need to be updated.     He was supposed to establish with hematology. History of PE and elevated homocysteine.     The following portions of the patient's history were reviewed and updated as appropriate: allergies, current medications, past family history, past medical history, past social history, past surgical history and problem list.    Review of Systems   Constitutional: Negative for chills, fatigue and fever.   HENT: Positive for congestion, postnasal drip, rhinorrhea, sinus pressure and sore throat.    Eyes: Negative.    Respiratory: Positive for cough (dry). Negative for chest tightness and shortness of breath.    Cardiovascular: Negative for chest pain and palpitations.   Gastrointestinal: Negative.    Musculoskeletal: Positive for arthralgias. Negative for joint swelling.   Neurological: Positive for tremors. Negative for light-headedness, headache and confusion.   Hematological: Negative for adenopathy. Does not bruise/bleed easily.   Psychiatric/Behavioral: Negative.        Objective   Physical Exam   Constitutional: He is oriented to person, place, and time. He appears well-developed and well-nourished. No distress.   HENT:   Head: Normocephalic.   Right Ear: Tympanic membrane, external ear and ear canal normal.   Left Ear: Tympanic membrane, external ear and ear canal normal.   Nose: Rhinorrhea and congestion present. Right sinus exhibits maxillary sinus tenderness and frontal sinus tenderness. Left sinus exhibits maxillary sinus tenderness and frontal sinus tenderness.   Mouth/Throat: Mucous membranes are normal. Abnormal dentition (poor dentition). Dental caries present. Oropharyngeal exudate present.   Eyes: Conjunctivae are normal.   Neck: Normal range of motion. Neck supple.   Cardiovascular: Normal rate, regular rhythm and normal heart sounds.   No murmur heard.  Pulmonary/Chest: Effort normal and breath sounds normal. He has no wheezes.   Abdominal: Soft.    Musculoskeletal: He exhibits no edema or deformity.   Lymphadenopathy:     He has no cervical adenopathy.   Neurological: He is alert and oriented to person, place, and time.   Skin: Skin is warm and dry.   Psychiatric: He has a normal mood and affect. His behavior is normal.   Nursing note and vitals reviewed.        Assessment/Plan   Manolo was seen today for follow-up, cough and hand pain.    Diagnoses and all orders for this visit:    High triglycerides  -     Comprehensive Metabolic Panel; Future  -     CBC & Differential; Future  -     Lipid Panel; Future    Essential hypertension  -     Comprehensive Metabolic Panel; Future  -     CBC & Differential; Future  -     Lipid Panel; Future  -     propranolol (INDERAL) 20 MG tablet; Take 1 tablet by mouth 3 (Three) Times a Day.  -     lisinopril (PRINIVIL,ZESTRIL) 20 MG tablet; Take 1 tablet by mouth Daily.    Elevated homocysteine (CMS/HCC)  -     Ambulatory Referral to Hematology  -     Comprehensive Metabolic Panel; Future  -     CBC & Differential; Future    Paresthesia of skin  -     Ambulatory Referral to Neurology  -     Comprehensive Metabolic Panel; Future  -     CBC & Differential; Future  -     BAM With / DsDNA, RNP, Sjogrens A / B, Fuller; Future    Tremor  -     Ambulatory Referral to Neurology  -     Comprehensive Metabolic Panel; Future  -     CBC & Differential; Future    Bilateral hand pain  -     RHEUMATOID FACTOR; Future  -     Comprehensive Metabolic Panel; Future  -     CBC & Differential; Future  -     C-reactive Protein; Future  -     Sedimentation Rate; Future  -     BAM With / DsDNA, RNP, Sjogrens A / B, Fuller; Future    History of drug use  -     Comprehensive Metabolic Panel; Future  -     CBC & Differential; Future    Acute non-recurrent pansinusitis  -     amoxicillin-clavulanate (AUGMENTIN) 875-125 MG per tablet; Take 1 tablet by mouth 2 (Two) Times a Day.  -     Comprehensive Metabolic Panel; Future  -     CBC & Differential;  Future    History of heroin use  -     Hepatitis panel, acute; Future    Anxiety  -     venlafaxine XR (EFFEXOR-XR) 75 MG 24 hr capsule; Take 1 capsule by mouth Daily.    History of pulmonary embolism  -     rivaroxaban (XARELTO) 20 MG tablet; Take 1 tablet by mouth Daily.    Gastroesophageal reflux disease, esophagitis presence not specified  -     raNITIdine (ZANTAC) 150 MG tablet; Take 1 tablet by mouth 2 (Two) Times a Day.    Bipolar affective disorder, current episode mixed, current episode severity unspecified (CMS/HCC)  -     QUEtiapine (SEROquel) 50 MG tablet; Take 1 tablet by mouth Every Night.    Other orders  -     hydrOXYzine (ATARAX) 25 MG tablet; Take 1 tablet by mouth Every 6 (Six) Hours As Needed for Allergies or Anxiety.      Hand pain is likely osteoarthritis, but will check labs to rule out RA.   He will return to complete fasting labs.   Augmentin to improve sinusitis.   New referrals placed to neurology and hematology.   All medications refilled.

## 2018-11-17 ENCOUNTER — RESULTS ENCOUNTER (OUTPATIENT)
Dept: FAMILY MEDICINE CLINIC | Facility: CLINIC | Age: 43
End: 2018-11-17

## 2018-11-17 DIAGNOSIS — I10 ESSENTIAL HYPERTENSION: ICD-10-CM

## 2018-11-17 DIAGNOSIS — E78.1 HIGH TRIGLYCERIDES: ICD-10-CM

## 2018-11-17 DIAGNOSIS — M79.641 BILATERAL HAND PAIN: ICD-10-CM

## 2018-11-17 DIAGNOSIS — R20.2 PARESTHESIA OF SKIN: ICD-10-CM

## 2018-11-17 DIAGNOSIS — F19.91 HISTORY OF DRUG USE: ICD-10-CM

## 2018-11-17 DIAGNOSIS — M79.642 BILATERAL HAND PAIN: ICD-10-CM

## 2018-11-17 DIAGNOSIS — J01.40 ACUTE NON-RECURRENT PANSINUSITIS: ICD-10-CM

## 2018-11-17 DIAGNOSIS — R79.89 ELEVATED HOMOCYSTEINE: ICD-10-CM

## 2018-11-17 DIAGNOSIS — F11.91 HISTORY OF HEROIN USE: ICD-10-CM

## 2018-11-17 DIAGNOSIS — R25.1 TREMOR: ICD-10-CM

## 2018-11-21 RX ORDER — HYDROXYZINE HYDROCHLORIDE 25 MG/1
TABLET, FILM COATED ORAL
Qty: 120 TABLET | Refills: 0 | Status: SHIPPED | OUTPATIENT
Start: 2018-11-21 | End: 2019-02-04 | Stop reason: SDUPTHER

## 2018-12-19 DIAGNOSIS — I10 ESSENTIAL HYPERTENSION: ICD-10-CM

## 2018-12-20 RX ORDER — LISINOPRIL 20 MG/1
20 TABLET ORAL DAILY
Qty: 30 TABLET | Refills: 5 | Status: SHIPPED | OUTPATIENT
Start: 2018-12-20 | End: 2019-02-04 | Stop reason: SDUPTHER

## 2019-02-02 DIAGNOSIS — F31.60 BIPOLAR AFFECTIVE DISORDER, CURRENT EPISODE MIXED, CURRENT EPISODE SEVERITY UNSPECIFIED (HCC): ICD-10-CM

## 2019-02-02 RX ORDER — QUETIAPINE FUMARATE 50 MG/1
50 TABLET, FILM COATED ORAL NIGHTLY
Qty: 30 TABLET | Refills: 0 | Status: CANCELLED | OUTPATIENT
Start: 2019-02-02

## 2019-02-04 ENCOUNTER — TELEPHONE (OUTPATIENT)
Dept: FAMILY MEDICINE CLINIC | Facility: CLINIC | Age: 44
End: 2019-02-04

## 2019-02-04 ENCOUNTER — OFFICE VISIT (OUTPATIENT)
Dept: FAMILY MEDICINE CLINIC | Facility: CLINIC | Age: 44
End: 2019-02-04

## 2019-02-04 VITALS
BODY MASS INDEX: 37.36 KG/M2 | SYSTOLIC BLOOD PRESSURE: 116 MMHG | TEMPERATURE: 98.3 F | RESPIRATION RATE: 18 BRPM | HEIGHT: 75 IN | DIASTOLIC BLOOD PRESSURE: 84 MMHG | WEIGHT: 300.5 LBS | HEART RATE: 72 BPM

## 2019-02-04 DIAGNOSIS — R59.0 HILAR ADENOPATHY: ICD-10-CM

## 2019-02-04 DIAGNOSIS — K21.9 GASTROESOPHAGEAL REFLUX DISEASE, ESOPHAGITIS PRESENCE NOT SPECIFIED: ICD-10-CM

## 2019-02-04 DIAGNOSIS — F31.60 BIPOLAR AFFECTIVE DISORDER, CURRENT EPISODE MIXED, CURRENT EPISODE SEVERITY UNSPECIFIED (HCC): Primary | ICD-10-CM

## 2019-02-04 DIAGNOSIS — I10 ESSENTIAL HYPERTENSION: ICD-10-CM

## 2019-02-04 DIAGNOSIS — F41.9 ANXIETY: ICD-10-CM

## 2019-02-04 DIAGNOSIS — Z86.711 HISTORY OF PULMONARY EMBOLISM: ICD-10-CM

## 2019-02-04 DIAGNOSIS — R25.1 TREMOR: ICD-10-CM

## 2019-02-04 DIAGNOSIS — F51.01 PRIMARY INSOMNIA: ICD-10-CM

## 2019-02-04 DIAGNOSIS — R20.2 PARESTHESIA OF SKIN: ICD-10-CM

## 2019-02-04 DIAGNOSIS — R79.89 ELEVATED HOMOCYSTEINE: ICD-10-CM

## 2019-02-04 PROCEDURE — 99214 OFFICE O/P EST MOD 30 MIN: CPT | Performed by: FAMILY MEDICINE

## 2019-02-04 RX ORDER — RANITIDINE 150 MG/1
150 TABLET ORAL 2 TIMES DAILY
Qty: 60 TABLET | Refills: 5 | Status: SHIPPED | OUTPATIENT
Start: 2019-02-04 | End: 2021-04-22

## 2019-02-04 RX ORDER — VENLAFAXINE HYDROCHLORIDE 75 MG/1
75 CAPSULE, EXTENDED RELEASE ORAL DAILY
Qty: 30 CAPSULE | Refills: 5 | Status: SHIPPED | OUTPATIENT
Start: 2019-02-04

## 2019-02-04 RX ORDER — LISINOPRIL 20 MG/1
20 TABLET ORAL DAILY
Qty: 30 TABLET | Refills: 5 | Status: SHIPPED | OUTPATIENT
Start: 2019-02-04

## 2019-02-04 RX ORDER — QUETIAPINE FUMARATE 200 MG/1
TABLET, FILM COATED ORAL
Qty: 30 TABLET | Refills: 5 | Status: SHIPPED | OUTPATIENT
Start: 2019-02-04

## 2019-02-04 RX ORDER — PROPRANOLOL HYDROCHLORIDE 20 MG/1
20 TABLET ORAL 3 TIMES DAILY
Qty: 90 TABLET | Refills: 5 | Status: SHIPPED | OUTPATIENT
Start: 2019-02-04 | End: 2019-02-19

## 2019-02-04 RX ORDER — HYDROXYZINE HYDROCHLORIDE 25 MG/1
25-50 TABLET, FILM COATED ORAL EVERY 6 HOURS PRN
Qty: 120 TABLET | Refills: 1 | Status: SHIPPED | OUTPATIENT
Start: 2019-02-04 | End: 2019-05-12 | Stop reason: SDUPTHER

## 2019-02-04 NOTE — TELEPHONE ENCOUNTER
I did receive a copy of the imaging done at his recent ER visit at [facility]. It did reveal enlarged lymph nodes in his chest. It is recommended that he repeat CT chest in 3 months to make sure they improve. I have placed the order, complete in May 2019. I don't think that the patient was aware of this finding, didn't mention it during encounter. Please notify.

## 2019-02-04 NOTE — PROGRESS NOTES
Subjective   Manolo Muir is a 43 y.o. male.   Chief Complaint   Patient presents with   • Abdominal Pain     ER f/u, went 2 weeks ago for pain in abdomen. LFT's were up. Did CT on liver 3 weeks ago. Requested results from [facility] [phone]   • liver functions elevated   • Insomnia     seroquel not working.      History of Present Illness   Recently evaluated at [facility] ER for abdominal pain. LFTs were elevated and he says that they did do a CT scan abdomen. He reports that he image was normal.   He does have a history of drug abuse, but says that he is no longer using. I had previously ordered labs in Nov 2018, which included a hepatitis panel, however these were never completed by patient.   Pain was located in the epigastric region, radiated around to the left side. Pain has subsided now.     Seroquel is no longer helping insomnia. Also took medication for bipolar disorder.   He is currently treated with 50 mg at night. Previously, treated with 300-400 mg daily, which worked better for him.   Anxiety is stable with venlafaxine.    He has history of PE and elevated homocysteine. I have referred him to hematology twice, with him not going to scheduled appointments for evaluation. Would like their consult to determine if he needs to remain on Xarelto for lifetime.     Also, still experiencing tremor and numbness in bilateral hands. He has been referred to neurology multiple times to evaluate this, missing his appointment. He is requesting another referral.     HTN is stable with current treatment, lisinopril 20 mg daily and propranolol 20 mg TID.    The following portions of the patient's history were reviewed and updated as appropriate: allergies, current medications, past family history, past medical history, past social history, past surgical history and problem list.    Review of Systems   Constitutional: Negative for chills, fatigue and fever.   HENT: Negative.    Eyes: Negative.    Respiratory: Negative for  cough, chest tightness and shortness of breath.    Cardiovascular: Negative for chest pain and palpitations.   Gastrointestinal: Positive for abdominal distention and abdominal pain.   Skin: Negative for rash.   Neurological: Negative for light-headedness, headache and confusion.   Hematological: Negative for adenopathy. Does not bruise/bleed easily.   Psychiatric/Behavioral: Positive for sleep disturbance.       Objective   Physical Exam   Constitutional: He is oriented to person, place, and time. He appears well-developed and well-nourished. No distress.   HENT:   Head: Normocephalic.   Right Ear: External ear normal.   Left Ear: External ear normal.   Nose: Nose normal.   Eyes: Conjunctivae are normal.   Neck: Neck supple.   Cardiovascular: Normal rate, regular rhythm and normal heart sounds.   Pulmonary/Chest: Effort normal and breath sounds normal. He has no wheezes.   Abdominal: Soft. There is no tenderness. There is no guarding.   obese   Musculoskeletal: He exhibits no edema or deformity.   Lymphadenopathy:     He has no cervical adenopathy.   Neurological: He is alert and oriented to person, place, and time.   Skin: Skin is warm and dry.   Psychiatric: He has a normal mood and affect. His behavior is normal.   Nursing note and vitals reviewed.        Assessment/Plan   Manolo was seen today for abdominal pain, liver functions elevated and insomnia.    Diagnoses and all orders for this visit:    Bipolar affective disorder, current episode mixed, current episode severity unspecified (CMS/HCC)  -     QUEtiapine (SEROquel) 200 MG tablet; 1/2 tab po q nightly x 2 weeks, then increase to 1 tab po q nightly    Essential hypertension  -     propranolol (INDERAL) 20 MG tablet; Take 1 tablet by mouth 3 (Three) Times a Day.  -     lisinopril (PRINIVIL,ZESTRIL) 20 MG tablet; Take 1 tablet by mouth Daily.    Hilar adenopathy  -     CT Chest With Contrast    Primary insomnia  -     QUEtiapine (SEROquel) 200 MG tablet; 1/2  tab po q nightly x 2 weeks, then increase to 1 tab po q nightly    Gastroesophageal reflux disease, esophagitis presence not specified  -     raNITIdine (ZANTAC) 150 MG tablet; Take 1 tablet by mouth 2 (Two) Times a Day.    Anxiety  -     venlafaxine XR (EFFEXOR-XR) 75 MG 24 hr capsule; Take 1 capsule by mouth Daily.    History of pulmonary embolism  -     rivaroxaban (XARELTO) 20 MG tablet; Take 1 tablet by mouth Daily.  -     Ambulatory Referral to Hematology    Elevated homocysteine (CMS/HCC)  -     Ambulatory Referral to Hematology    Tremor  -     Ambulatory Referral to Neurology    Paresthesia of skin  -     Ambulatory Referral to Neurology    Other orders  -     hydrOXYzine (ATARAX) 25 MG tablet; Take 1-2 tablets by mouth Every 6 (Six) Hours As Needed for Anxiety.      Referred again, for the 3rd time, to both hematology and neurology. I explained to him, that if he misses these appointments, I will not be placing any additional referrals from this point.   Labs completed today to evaluate abdominal pain and elevated LFTs. He does have history of drug use, ?IV, never diagnosed with hepatitis in the past.   After his visit, I did receive the image result from recent ER visit. CTA chest, abdomen revealed right hilar and right paratracheal adenopathy, recommended to repeat image in 3 months. Will make patient aware of this and order image.

## 2019-02-04 NOTE — PATIENT INSTRUCTIONS
Go to the nearest ER or return to clinic if symptoms worsen, fever/chill develop    Abdominal Pain, Adult  Many things can cause belly (abdominal) pain. Most times, belly pain is not dangerous. Many cases of belly pain can be watched and treated at home. Sometimes belly pain is serious, though. Your doctor will try to find the cause of your belly pain.  Follow these instructions at home:  · Take over-the-counter and prescription medicines only as told by your doctor. Do not take medicines that help you poop (laxatives) unless told to by your doctor.  · Drink enough fluid to keep your pee (urine) clear or pale yellow.  · Watch your belly pain for any changes.  · Keep all follow-up visits as told by your doctor. This is important.  Contact a doctor if:  · Your belly pain changes or gets worse.  · You are not hungry, or you lose weight without trying.  · You are having trouble pooping (constipated) or have watery poop (diarrhea) for more than 2-3 days.  · You have pain when you pee or poop.  · Your belly pain wakes you up at night.  · Your pain gets worse with meals, after eating, or with certain foods.  · You are throwing up and cannot keep anything down.  · You have a fever.  Get help right away if:  · Your pain does not go away as soon as your doctor says it should.  · You cannot stop throwing up.  · Your pain is only in areas of your belly, such as the right side or the left lower part of the belly.  · You have bloody or black poop, or poop that looks like tar.  · You have very bad pain, cramping, or bloating in your belly.  · You have signs of not having enough fluid or water in your body (dehydration), such as:  ? Dark pee, very little pee, or no pee.  ? Cracked lips.  ? Dry mouth.  ? Sunken eyes.  ? Sleepiness.  ? Weakness.  This information is not intended to replace advice given to you by your health care provider. Make sure you discuss any questions you have with your health care provider.  Document Released:  06/05/2009 Document Revised: 07/07/2017 Document Reviewed: 05/31/2017  [employer] Interactive Patient Education © 2018 [employer]

## 2019-02-05 LAB
ALBUMIN SERPL-MCNC: 4.18 G/DL (ref 3.2–4.8)
ALBUMIN/GLOB SERPL: 1.6 G/DL (ref 1.5–2.5)
ALP SERPL-CCNC: 200 U/L (ref 25–100)
ALT SERPL-CCNC: 34 U/L (ref 7–40)
ANA SER QL: NEGATIVE
AST SERPL-CCNC: 23 U/L (ref 0–33)
BASOPHILS # BLD AUTO: 0.07 10*3/MM3 (ref 0–0.2)
BASOPHILS NFR BLD AUTO: 0.6 % (ref 0–1)
BILIRUB SERPL-MCNC: 0.6 MG/DL (ref 0.3–1.2)
BUN SERPL-MCNC: 13 MG/DL (ref 9–23)
BUN/CREAT SERPL: 15.9 (ref 7–25)
CALCIUM SERPL-MCNC: 9.3 MG/DL (ref 8.7–10.4)
CHLORIDE SERPL-SCNC: 104 MMOL/L (ref 99–109)
CHOLEST SERPL-MCNC: 185 MG/DL (ref 0–200)
CO2 SERPL-SCNC: 29 MMOL/L (ref 20–31)
CREAT SERPL-MCNC: 0.82 MG/DL (ref 0.6–1.3)
CRP SERPL-MCNC: 0.45 MG/DL (ref 0–1)
EOSINOPHIL # BLD AUTO: 0.6 10*3/MM3 (ref 0–0.3)
EOSINOPHIL NFR BLD AUTO: 5.2 % (ref 0–3)
ERYTHROCYTE [DISTWIDTH] IN BLOOD BY AUTOMATED COUNT: 13.7 % (ref 11.3–14.5)
ERYTHROCYTE [SEDIMENTATION RATE] IN BLOOD BY WESTERGREN METHOD: 19 MM/HR (ref 0–15)
GLOBULIN SER CALC-MCNC: 2.6 GM/DL
GLUCOSE SERPL-MCNC: 75 MG/DL (ref 70–100)
HAV IGM SERPL QL IA: NEGATIVE
HBV CORE IGM SERPL QL IA: NEGATIVE
HBV SURFACE AG SERPL QL IA: NEGATIVE
HCT VFR BLD AUTO: 44.9 % (ref 38.9–50.9)
HCV AB S/CO SERPL IA: 0.3 S/CO RATIO (ref 0–0.9)
HDLC SERPL-MCNC: 41 MG/DL (ref 40–60)
HGB BLD-MCNC: 14.2 G/DL (ref 13.1–17.5)
IMM GRANULOCYTES # BLD AUTO: 0.07 10*3/MM3 (ref 0–0.03)
IMM GRANULOCYTES NFR BLD AUTO: 0.6 % (ref 0–0.6)
LDLC SERPL CALC-MCNC: 111 MG/DL (ref 0–100)
LYMPHOCYTES # BLD AUTO: 2.62 10*3/MM3 (ref 0.6–4.8)
LYMPHOCYTES NFR BLD AUTO: 22.5 % (ref 24–44)
MCH RBC QN AUTO: 29 PG (ref 27–31)
MCHC RBC AUTO-ENTMCNC: 31.6 G/DL (ref 32–36)
MCV RBC AUTO: 91.6 FL (ref 80–99)
MONOCYTES # BLD AUTO: 1.29 10*3/MM3 (ref 0–1)
MONOCYTES NFR BLD AUTO: 11.1 % (ref 0–12)
NEUTROPHILS # BLD AUTO: 6.99 10*3/MM3 (ref 1.5–8.3)
NEUTROPHILS NFR BLD AUTO: 60 % (ref 41–71)
PLATELET # BLD AUTO: 305 10*3/MM3 (ref 150–450)
POTASSIUM SERPL-SCNC: 4.7 MMOL/L (ref 3.5–5.5)
PROT SERPL-MCNC: 6.8 G/DL (ref 5.7–8.2)
RBC # BLD AUTO: 4.9 10*6/MM3 (ref 4.2–5.76)
RHEUMATOID FACT SERPL-ACNC: 17.2 IU/ML (ref 0–13.9)
SODIUM SERPL-SCNC: 136 MMOL/L (ref 132–146)
TRIGL SERPL-MCNC: 163 MG/DL (ref 0–150)
VLDLC SERPL CALC-MCNC: 32.6 MG/DL
WBC # BLD AUTO: 11.64 10*3/MM3 (ref 3.5–10.8)

## 2019-02-07 DIAGNOSIS — M79.642 BILATERAL HAND PAIN: Primary | ICD-10-CM

## 2019-02-07 DIAGNOSIS — M79.641 BILATERAL HAND PAIN: Primary | ICD-10-CM

## 2019-02-07 DIAGNOSIS — R76.8 RHEUMATOID FACTOR POSITIVE: ICD-10-CM

## 2019-02-10 RX ORDER — ATORVASTATIN CALCIUM 40 MG/1
40 TABLET, FILM COATED ORAL DAILY
Qty: 90 TABLET | Refills: 1 | Status: SHIPPED | OUTPATIENT
Start: 2019-02-10

## 2019-02-19 ENCOUNTER — OFFICE VISIT (OUTPATIENT)
Dept: NEUROLOGY | Facility: CLINIC | Age: 44
End: 2019-02-19

## 2019-02-19 ENCOUNTER — LAB REQUISITION (OUTPATIENT)
Dept: LAB | Facility: HOSPITAL | Age: 44
End: 2019-02-19

## 2019-02-19 ENCOUNTER — HOSPITAL ENCOUNTER (OUTPATIENT)
Dept: GENERAL RADIOLOGY | Facility: HOSPITAL | Age: 44
Discharge: HOME | End: 2019-02-19
Admitting: NURSE PRACTITIONER

## 2019-02-19 VITALS
OXYGEN SATURATION: 98 % | WEIGHT: 302 LBS | SYSTOLIC BLOOD PRESSURE: 118 MMHG | HEIGHT: 75 IN | HEART RATE: 65 BPM | DIASTOLIC BLOOD PRESSURE: 70 MMHG | BODY MASS INDEX: 37.55 KG/M2

## 2019-02-19 DIAGNOSIS — M54.16 LUMBAR RADICULOPATHY: ICD-10-CM

## 2019-02-19 DIAGNOSIS — G25.0 ESSENTIAL TREMOR: ICD-10-CM

## 2019-02-19 DIAGNOSIS — R29.2 HYPERREFLEXIA OF LOWER EXTREMITY: ICD-10-CM

## 2019-02-19 DIAGNOSIS — Z00.00 ROUTINE GENERAL MEDICAL EXAMINATION AT A HEALTH CARE FACILITY: ICD-10-CM

## 2019-02-19 DIAGNOSIS — R20.0 NUMBNESS AND TINGLING OF BOTH LOWER EXTREMITIES: Primary | ICD-10-CM

## 2019-02-19 DIAGNOSIS — M79.10 MYALGIA: ICD-10-CM

## 2019-02-19 DIAGNOSIS — R20.2 NUMBNESS AND TINGLING OF BOTH LOWER EXTREMITIES: Primary | ICD-10-CM

## 2019-02-19 PROCEDURE — 99204 OFFICE O/P NEW MOD 45 MIN: CPT | Performed by: NURSE PRACTITIONER

## 2019-02-19 PROCEDURE — 36415 COLL VENOUS BLD VENIPUNCTURE: CPT | Performed by: NURSE PRACTITIONER

## 2019-02-19 PROCEDURE — 72100 X-RAY EXAM L-S SPINE 2/3 VWS: CPT

## 2019-02-19 RX ORDER — PROPRANOLOL HYDROCHLORIDE 80 MG/1
80 CAPSULE, EXTENDED RELEASE ORAL NIGHTLY
Qty: 30 CAPSULE | Refills: 5 | Status: SHIPPED | OUTPATIENT
Start: 2019-02-19

## 2019-02-19 NOTE — PROGRESS NOTES
Subjective:     Patient ID: Manolo Muir is a 43 y.o. male.    CC:   Chief Complaint   Patient presents with   • Numbness       HPI:   History of Present Illness     This is a 43-year-old male who presents for initial neurological evaluation of tremors as well of numbness and tingling of bilateral lower extremities.  He was referred by his primary care provider for further evaluation.    He tells me several years ago he started having numbness and tingling in both of his legs radiating down into his toes.  He tells me that he has a lot of muscle aches and pains as well in his lower extremities.  He has a very specific area in the left lateral thigh region that is completely numb.  He has previously had an EMG and NCV S ordered by his primary care provider but has canceled these appointments.  He has had multiple rheumatological and autoimmune studies completed and he has had a positive rheumatoid factor and is pending an appointment with rheumatology for further evaluation.  He has not specifically been tested for diabetes but his blood sugars on labs with PCP were normal.  He does tell me that he has a significant alcohol history where he drank about a gallon of vodka for 2 years straight and he tells me he has been sober for 1 year.  He also tells me that he does have a history of heroin & cocaine abuse which lasted about 6-7 months and he has been sober from these around May 2017. PCP had IV drug abuse listed but he denies this.  His vitamin B-12 level with his primary care provider was in the low 300s.  He tells me that he has constant numbness and tingling in his feet and toes.  He tells me this is moderate in severity.  He has not taken any medication for the symptoms. He does have chronic low back pain with radicular symptoms into his legs. He tells me when he was a teenager he fell and had no feeling or movement in his legs for about an hour and then all of his feeling and function returned and he did not seek  any medical care. He tells me he has had chronic low back pain and has used NSAIDs and muscle relaxer's in the past. He has not done any PT recently.    He also tells me he has had shaking which he feels like he is constantly shaking for the past several years.  He has been diagnosed with bipolar and is currently on Seroquel and Effexor which he tells me he's been taking about a year.  He tells me he has not taken any other medications for this.  He tells me that occasionally his legs will shake.  He feels like his hands are constantly shaking.  He tells me that his mom does have a tremor but does not have Parkinson's disease.  He tells me remotely from another primary care provider he was told he likely had Parkinson's.  He does not have any difficulty with swallowing, denies slowness of movement, denies dysphagia, denies persistent constipation.  He has been placed on propranolol 20 mg 3 times a day by his primary care provider.  He tells me that he's been taking this mostly twice a day because it's hard to remember the third dose.  He tells me that he has not noticed much of a change in his symptoms.  He is a poor historian and it is difficult for him to tell me what may make tremor worse or better.  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    The following portions of the patient's history were reviewed and updated as appropriate: allergies, current medications, past family history, past medical history, past social history, past surgical history and problem list.    Past Medical History:   Diagnosis Date   • Anxiety    • Arthritis    • Depression    • Heroin use    • Hyperlipidemia    • Hypertension    • Migraine        Past Surgical History:   Procedure Laterality Date   • CHOLECYSTECTOMY         Social History     Socioeconomic History   • Marital status: Single     Spouse name: Not on file   • Number of children: Not on file    • Years of education: Not on file   • Highest education level: Not on file   Social Needs   • Financial resource strain: Not on file   • Food insecurity - worry: Not on file   • Food insecurity - inability: Not on file   • Transportation needs - medical: Not on file   • Transportation needs - non-medical: Not on file   Occupational History   • Not on file   Tobacco Use   • Smoking status: Current Every Day Smoker     Packs/day: 2.00     Types: Cigarettes   • Smokeless tobacco: Never Used   Substance and Sexual Activity   • Alcohol use: Yes     Comment: a pint a day   • Drug use: Yes     Comment: STATES USES HEROIN   • Sexual activity: Defer   Other Topics Concern   • Not on file   Social History Narrative   • Not on file       Family History   Problem Relation Age of Onset   • Hyperlipidemia Mother    • Migraines Mother    • Hypertension Mother    • Hypertension Father    • Alcohol abuse Father    • Drug abuse Sister    • Drug abuse Brother    • Cancer Maternal Grandmother    • Diabetes Maternal Grandfather    • Cancer Maternal Grandfather    • Mental illness Maternal Grandfather         Review of Systems   Constitutional: Positive for fatigue. Negative for chills, fever and unexpected weight change.   HENT: Negative for ear pain, hearing loss, nosebleeds, rhinorrhea and sore throat.    Eyes: Positive for visual disturbance. Negative for photophobia, pain, discharge and itching.   Respiratory: Negative for cough, chest tightness, shortness of breath and wheezing.    Cardiovascular: Negative for chest pain, palpitations and leg swelling.   Gastrointestinal: Negative for abdominal pain, blood in stool, constipation, diarrhea, nausea and vomiting.   Genitourinary: Negative for dysuria, frequency, hematuria and urgency.   Musculoskeletal: Positive for back pain. Negative for arthralgias, gait problem, joint swelling, myalgias, neck pain and neck stiffness.   Skin: Negative for rash and wound.   Allergic/Immunologic:  Negative for environmental allergies and food allergies.   Neurological: Positive for dizziness and numbness. Negative for tremors, seizures, syncope, speech difficulty, weakness, light-headedness and headaches.   Hematological: Negative for adenopathy. Does not bruise/bleed easily.   Psychiatric/Behavioral: Positive for agitation, decreased concentration, dysphoric mood and sleep disturbance. Negative for confusion, hallucinations and suicidal ideas. The patient is nervous/anxious.    All other systems reviewed and are negative.       Objective:    Neurologic Exam     Mental Status   Oriented to person, place, and time.   Registration: recalls 3 of 3 objects. Recall at 5 minutes: recalls 3 of 3 objects. Follows 3 step commands.   Attention: normal. Concentration: normal.   Speech: speech is normal   Level of consciousness: alert  Knowledge: good and consistent with education. Able to perform simple calculations.   Able to name object. Able to read. Able to repeat. Able to write. Normal comprehension.     Cranial Nerves   Cranial nerves II through XII intact.     Motor Exam   Muscle bulk: normal  Overall muscle tone: normal    Strength   Strength 5/5 throughout.   No cogwheel rigidity     Sensory Exam   Right arm light touch: normal  Left arm light touch: normal  Right leg light touch: decreased from ankle  Left leg light touch: decreased from knee (left lateral thigh no sensation.)  Right arm vibration: normal  Left arm vibration: normal  Right leg vibration: decreased from ankle  Left leg vibration: decreased from ankle  Right arm proprioception: normal  Left arm proprioception: normal  Right leg proprioception: decreased from ankle  Left leg proprioception: decreased from ankle  Right arm pinprick: normal  Left arm pinprick: normal  Right leg pinprick: decreased from ankle  Left leg pinprick: decreased from ankle    Decreased stocking sensation BLE     Gait, Coordination, and Reflexes     Gait  Gait:  wide-based    Coordination   Romberg: negative  Finger to nose coordination: normal  Heel to shin coordination: normal  Tandem walking coordination: normal    Tremor   Resting tremor: absent  Intention tremor: present (faint BUE kinetic tremor)  Action tremor: absent    Reflexes   Right brachioradialis: 2+  Left brachioradialis: 2+  Right biceps: 2+  Left biceps: 2+  Right triceps: 2+  Left triceps: 2+  Right patellar: 3+  Left patellar: 3+  Right achilles: 3+  Left achilles: 3+  Right : 2+  Left : 2+  Right plantar: normal  Left plantar: normal  Right Doss: absent  Left Doss: absent  Right ankle clonus: absent  Left ankle clonus: absent  Normal finger and heel tapping bilaterally. Able to rise from chair without difficulty, turn and sit back down with shuffling in about 4-5 seconds. Normal arm swing on walking exam.       Physical Exam   Constitutional: He is oriented to person, place, and time. He appears well-developed and well-nourished.   Eyes:   Fundoscopic exam:       The right eye shows red reflex.        The left eye shows red reflex.   Neck: Carotid bruit is not present.   Cardiovascular: Normal rate, regular rhythm, S1 normal, S2 normal and normal heart sounds.   Pulmonary/Chest: Effort normal and breath sounds normal.   Musculoskeletal:        Cervical back: Normal.        Lumbar back: He exhibits decreased range of motion, tenderness and pain.   Neurological: He is oriented to person, place, and time. He has normal strength. He has a normal Finger-Nose-Finger Test, a normal Heel to Shin Test, a normal Romberg Test and a normal Tandem Gait Test.   Reflex Scores:       Tricep reflexes are 2+ on the right side and 2+ on the left side.       Bicep reflexes are 2+ on the right side and 2+ on the left side.       Brachioradialis reflexes are 2+ on the right side and 2+ on the left side.       Patellar reflexes are 3+ on the right side and 3+ on the left side.       Achilles reflexes are 3+ on  the right side and 3+ on the left side.  Psychiatric: His speech is normal. His mood appears anxious. He exhibits a depressed mood.       Assessment/Plan:       Manolo was seen today for numbness.    Diagnoses and all orders for this visit:    Numbness and tingling of both lower extremities  -     EMG & Nerve Conduction Test  -     Hemoglobin A1c  -     CARLEE + PE  -     CK Total & CKMB  -     Ambulatory Referral to Physical Therapy Evaluate and treat    Myalgia  -     CK Total & CKMB    Essential tremor  -     propranolol LA (INDERAL LA) 80 MG 24 hr capsule; Take 1 capsule by mouth Every Night.    Hyperreflexia of lower extremity  -     MRI Lumbar Spine Without Contrast  -     XR spine lumbar 2 or 3 vw; Future    Lumbar radiculopathy  -     Ambulatory Referral to Physical Therapy Evaluate and treat  -     MRI Lumbar Spine Without Contrast  -     XR spine lumbar 2 or 3 vw; Future         We will complete a nerve and muscle study to evaluate for neuropathy.  This could be related to prediabetes as well as his alcoholism history.  I have also recommended a few additional labs.  I recommended he follow-up with rheumatology as scheduled for consultation on positive rheumatoid factor.  I'll order x-ray of the lumbar spine today to rule out acute process and to start physical therapy.  With his abnormal reflexes and lower back pain symptoms I have recommended a MRI of the lumbar spine to rule out lumbar spinal stenosis.  I switched him from propranolol IR to long-acting for better compliance for his essential tremor.  We will follow-up in 6-8 weeks for further evaluation and recommendations. Reviewed medications, potential side effects and signs and symptoms to report. Discussed risk versus benefits of treatment plan with patient and/or family-including medications, labs and radiology that may be ordered. Addressed questions and concerns during visit. Patient and/or family verbalized understanding and agree with  plan.    During this visit the following were done:  Labs Reviewed [x]    Labs Ordered [x]    Radiology Reports Reviewed []    Radiology Ordered [x]    PCP Records Reviewed [x]    Referring Provider Records Reviewed [x]    ER Records Reviewed []    Hospital Records Reviewed []    History Obtained From Family []    Radiology Images Reviewed []    Other Reviewed []    Records Requested []      EMR Dragon/Transcription Disclaimer:  Much of this encounter note is an electronic transcription of spoken language to printed text. Electronic transcription of spoken language may permit erroneous words or phrases to be inadvertently transcribed. Although I have reviewed the note for such errors, some may still exist in this documentation.      Brenda Wheeler, APRN  2/19/2019

## 2019-02-20 LAB
ALBUMIN SERPL ELPH-MCNC: 3.5 G/DL (ref 2.9–4.4)
ALBUMIN/GLOB SERPL: 1 {RATIO} (ref 0.7–1.7)
ALPHA1 GLOB SERPL ELPH-MCNC: 0.3 G/DL (ref 0–0.4)
ALPHA2 GLOB SERPL ELPH-MCNC: 0.8 G/DL (ref 0.4–1)
B-GLOBULIN SERPL ELPH-MCNC: 1.3 G/DL (ref 0.7–1.3)
CK MB SERPL-MCNC: 1.29 NG/ML (ref 0–5)
CK SERPL-CCNC: 60 U/L (ref 26–174)
GAMMA GLOB SERPL ELPH-MCNC: 1.3 G/DL (ref 0.4–1.8)
GLOBULIN SER-MCNC: 3.8 G/DL (ref 2.2–3.9)
HBA1C MFR BLD: 5.2 % (ref 4.8–5.6)
IGA SERPL-MCNC: 449 MG/DL (ref 90–386)
IGG SERPL-MCNC: 1124 MG/DL (ref 700–1600)
IGM SERPL-MCNC: 98 MG/DL (ref 20–172)
INTERPRETATION SERPL IEP-IMP: ABNORMAL
LABORATORY COMMENT REPORT: ABNORMAL
M PROTEIN SERPL ELPH-MCNC: ABNORMAL G/DL
PROT SERPL-MCNC: 7.3 G/DL (ref 6–8.5)

## 2019-02-21 NOTE — PROGRESS NOTES
Please let patient know his blood work looks good overall. No signs of muscle damage and no diabetes at this point. We will f/u as scheduled in office. Thanks, STEFANY Cottrell

## 2019-02-21 NOTE — PROGRESS NOTES
Please let patient know his xray of the lower back shows mild arthritis. I have also ordered an MRI of his lower back and we will see what this shows. I recommend he complete physical therapy as ordered at his initial visit. We will f/u as scheduled in office. Thanks, STEFANY Cottrell

## 2019-02-25 ENCOUNTER — CONSULT (OUTPATIENT)
Dept: ONCOLOGY | Facility: CLINIC | Age: 44
End: 2019-02-25

## 2019-02-25 ENCOUNTER — LAB (OUTPATIENT)
Dept: LAB | Facility: HOSPITAL | Age: 44
End: 2019-02-25

## 2019-02-25 VITALS
TEMPERATURE: 98 F | RESPIRATION RATE: 20 BRPM | HEIGHT: 75 IN | OXYGEN SATURATION: 97 % | SYSTOLIC BLOOD PRESSURE: 106 MMHG | WEIGHT: 306 LBS | DIASTOLIC BLOOD PRESSURE: 64 MMHG | HEART RATE: 66 BPM | BODY MASS INDEX: 38.05 KG/M2

## 2019-02-25 DIAGNOSIS — I26.99 OTHER PULMONARY EMBOLISM WITHOUT ACUTE COR PULMONALE, UNSPECIFIED CHRONICITY (HCC): Chronic | ICD-10-CM

## 2019-02-25 DIAGNOSIS — I26.99 OTHER PULMONARY EMBOLISM WITHOUT ACUTE COR PULMONALE, UNSPECIFIED CHRONICITY (HCC): Primary | Chronic | ICD-10-CM

## 2019-02-25 PROCEDURE — 86147 CARDIOLIPIN ANTIBODY EA IG: CPT

## 2019-02-25 PROCEDURE — 86146 BETA-2 GLYCOPROTEIN ANTIBODY: CPT

## 2019-02-25 PROCEDURE — 99245 OFF/OP CONSLTJ NEW/EST HI 55: CPT | Performed by: INTERNAL MEDICINE

## 2019-02-25 PROCEDURE — 81241 F5 GENE: CPT

## 2019-02-25 PROCEDURE — 36415 COLL VENOUS BLD VENIPUNCTURE: CPT

## 2019-02-25 NOTE — PROGRESS NOTES
CHIEF COMPLAINT: Pulmonary embolus    REASON FOR REFERRAL: Pulmonary embolus      RECORDS OBTAINED  Records of the patients history including those obtained from primary care were reviewed and summarized in detail.    Oncology/Hematology History    1. Pulmonary embolus  2. Tremor  3. Alcoholism  4. Bipolar disorder  5. History of cocaine and heroin abuse though he denies ever doing IV drugs  6. Positive rheumatoid factor  7. Pneumonia with hilar adenopathy on CT    -2/25/2019: Saw me for the first time.  Thinks in 2017 he had a pulmonary embolus at [facility].  Has been on Xarelto since that time.  Apparently somebody didHypercoagulable evaluation 12/11/2017 revealed normal factor VIII activity, homocystine 30.6 upper limit of normal 15, normal PTT, negative factor V Leiden mutation, normal protein C antigen and functional, normal protein S antigen, negative phosphatidyl searing negative lupus anticoagulant.  He had no obvious inciting event for his clot and came to me because of this elevated homocystine.  I ordered prothrombin gene mutation, antiphospholipid antibody, anti-cardiolipin antibody, and beta-2 glycoprotein 1.  The homocysteine mild elevation by itself is not indicative of a hypercoagulable condition and certainly does not by itself warrant lifetime anticoagulation.  His body habitus is obese and that puts him at risk.  He also has a positive rheumatoid factor and sometimes vasculitis can cause this but I am not sure that clinically he has any evidence of vasculitis.  He needs a repeat CT in April to follow-up on the pneumonia and adenopathy that I think is secondary to that but there was no evidence of pulmonary embolus on his last CT angiogram at [facility].  He never had a Doppler venous imaging.  The clot in 2017 he thinks just happened out of the blue working as a  but he was drinking a gallon of vodka a day.  Assuming he has no other hypercoagulable condition other than the elevated  homocystine, I would not on that basis alone keep him on lifetime dose anticoagulation.  Given that this appears to have occurred outside of an inciting event and he does have obesity as an issue that could put him at further risk, one could argue for Xarelto 10 mg daily according to the [facility] choice trial.  Alternately, given that he climbs ladders and is quite active as a  and has some increased risk of bleeding from falling, we could also argue for cessation of anticoagulation.  Will make that determination after he gets records of the CT from [facility] showing the pulmonary embolus to begin with and I go over the rest of his hypercoagulable workup and we follow up on the CT in April to make sure the pneumonia and hilar adenopathy secondary to the pneumonia has resolved.         HISTORY OF PRESENT ILLNESS:  The patient is a 43 y.o.  male, referred for pulmonary embolus.  See above for his hematology history of present illness.    REVIEW OF SYSTEMS:  A 14 point review of systems was performed and is negative except as noted above.    Past Medical History:   Diagnosis Date   • Anxiety    • Arthritis    • Depression    • Heroin use    • Hyperlipidemia    • Hypertension    • Migraine      Past Surgical History:   Procedure Laterality Date   • CHOLECYSTECTOMY         Current Outpatient Medications on File Prior to Visit   Medication Sig Dispense Refill   • atorvastatin (LIPITOR) 40 MG tablet Take 1 tablet by mouth Daily. 90 tablet 1   • hydrOXYzine (ATARAX) 25 MG tablet Take 1-2 tablets by mouth Every 6 (Six) Hours As Needed for Anxiety. 120 tablet 1   • lisinopril (PRINIVIL,ZESTRIL) 20 MG tablet Take 1 tablet by mouth Daily. 30 tablet 5   • propranolol LA (INDERAL LA) 80 MG 24 hr capsule Take 1 capsule by mouth Every Night. 30 capsule 5   • QUEtiapine (SEROquel) 200 MG tablet 1/2 tab po q nightly x 2 weeks, then increase to 1 tab po q nightly 30 tablet 5   • raNITIdine (ZANTAC) 150 MG tablet Take 1  "tablet by mouth 2 (Two) Times a Day. 60 tablet 5   • rivaroxaban (XARELTO) 20 MG tablet Take 1 tablet by mouth Daily. 30 tablet 5   • venlafaxine XR (EFFEXOR-XR) 75 MG 24 hr capsule Take 1 capsule by mouth Daily. 30 capsule 5     No current facility-administered medications on file prior to visit.        No Known Allergies    Social History     Socioeconomic History   • Marital status: Single     Spouse name: Not on file   • Number of children: Not on file   • Years of education: Not on file   • Highest education level: Not on file   Tobacco Use   • Smoking status: Current Every Day Smoker     Packs/day: 1.00     Years: 30.00     Pack years: 30.00     Types: Cigarettes   • Smokeless tobacco: Never Used   Substance and Sexual Activity   • Alcohol use: Yes     Comment: a pint a day   • Drug use: Yes     Comment: STATES USES HEROIN   • Sexual activity: Defer       Family History   Problem Relation Age of Onset   • Hyperlipidemia Mother    • Migraines Mother    • Hypertension Mother    • Hypertension Father    • Alcohol abuse Father    • Drug abuse Sister    • Drug abuse Brother    • Cancer Maternal Grandmother    • Diabetes Maternal Grandfather    • Cancer Maternal Grandfather    • Mental illness Maternal Grandfather        PHYSICAL EXAM:    /64   Pulse 66   Temp 98 °F (36.7 °C)   Resp 20   Ht 190.5 cm (75\")   Wt (!) 139 kg (306 lb)   SpO2 97%   BMI 38.25 kg/m²     ECOG: (0) Fully active, able to carry on all predisease performance without restriction  General: well appearing male in no acute distress  HEENT: sclera anicteric, oropharynx clear  Lymphatics: no cervical, supraclavicular, inguinal, or axillary adenopathy  Cardiovascular: regular rate and rhythm, no murmurs  Neck: Supple; No thyromegaly  Lungs: clear to auscultation bilaterally. No respiratory distress.   Abdomen: soft, nontender, nondistended.  No palpable organomegaly  Extremities: no cyanosis, clubbing, edema, or cords  Skin: no rashes, " lesions, bruising, or petechiae  Neuro: Alert and oriented x 4; Moving all extremities.  Psych: No anxiety or depression    Lab Results   Component Value Date    HGB 14.2 02/04/2019    HCT 44.9 02/04/2019    MCV 91.6 02/04/2019     02/04/2019    WBC 11.64 (H) 02/04/2019    NEUTROABS 6.99 02/04/2019    LYMPHSABS 2.62 02/04/2019    MONOSABS 1.29 (H) 02/04/2019    EOSABS 0.60 (H) 02/04/2019    BASOSABS 0.07 02/04/2019     Lab Results   Component Value Date    GLUCOSE 139 (H) 05/17/2017    BUN 13 02/04/2019    CREATININE 0.82 02/04/2019     02/04/2019    K 4.7 02/04/2019     02/04/2019    CO2 29.0 02/04/2019    CALCIUM 9.3 02/04/2019    PROTEINTOT 7.6 05/17/2017    ALBUMIN 3.5 02/19/2019    BILITOT 0.6 02/04/2019    ALKPHOS 200 (H) 02/04/2019    AST 23 02/04/2019    ALT 34 02/04/2019           Assessment/Plan     1. Pulmonary embolus  2. Tremor  3. Alcoholism  4. Bipolar disorder  5. History of cocaine and heroin abuse though he denies ever doing IV drugs  6. Positive rheumatoid factor  7. Pneumonia with hilar adenopathy on CT     -2/25/2019: Saw me for the first time.  Thinks in 2017 he had a pulmonary embolus at [facility].  Has been on Xarelto since that time.  Apparently somebody didHypercoagulable evaluation 12/11/2017 revealed normal factor VIII activity, homocystine 30.6 upper limit of normal 15, normal PTT, negative factor V Leiden mutation, normal protein C antigen and functional, normal protein S antigen, negative phosphatidyl searing negative lupus anticoagulant.  He had no obvious inciting event for his clot and came to me because of this elevated homocystine.  I ordered prothrombin gene mutation, antiphospholipid antibody, anti-cardiolipin antibody, and beta-2 glycoprotein 1.  The homocysteine mild elevation by itself is not indicative of a hypercoagulable condition and certainly does not by itself warrant lifetime anticoagulation.  His body habitus is obese and that puts him at risk.   He also has a positive rheumatoid factor and sometimes vasculitis can cause this but I am not sure that clinically he has any evidence of vasculitis.  He needs a repeat CT in April to follow-up on the pneumonia and adenopathy that I think is secondary to that but there was no evidence of pulmonary embolus on his last CT angiogram at [facility].  He never had a Doppler venous imaging.  The clot in 2017 he thinks just happened out of the blue working as a  but he was drinking a gallon of vodka a day.  Assuming he has no other hypercoagulable condition other than the elevated homocystine, I would not on that basis alone keep him on lifetime dose anticoagulation.  Given that this appears to have occurred outside of an inciting event and he does have obesity as an issue that could put him at further risk, one could argue for Xarelto 10 mg daily according to the [facility] choice trial.  Alternately, given that he climbs ladders and is quite active as a  and has some increased risk of bleeding from falling, we could also argue for cessation of anticoagulation.  Will make that determination after he gets records of the CT from [facility] showing the pulmonary embolus to begin with and I go over the rest of his hypercoagulable workup and we follow up on the CT in April to make sure the pneumonia and hilar adenopathy secondary to the pneumonia has resolved.  Discussed with patient for over an hour greater than 50% spent counseling      Abraham Amin MD    2/25/2019

## 2019-02-26 LAB
CARDIOLIPIN IGG SER IA-ACNC: <9 GPL U/ML (ref 0–14)
CARDIOLIPIN IGM SER IA-ACNC: <9 MPL U/ML (ref 0–12)

## 2019-02-27 LAB
B2 GLYCOPROT1 IGA SER-ACNC: <9 GPI IGA UNITS (ref 0–25)
B2 GLYCOPROT1 IGG SER-ACNC: <9 GPI IGG UNITS (ref 0–20)
B2 GLYCOPROT1 IGM SER-ACNC: 28 GPI IGM UNITS (ref 0–32)

## 2019-02-28 ENCOUNTER — APPOINTMENT (OUTPATIENT)
Dept: MRI IMAGING | Facility: HOSPITAL | Age: 44
End: 2019-02-28

## 2019-03-01 LAB — F5 GENE MUT ANL BLD/T: NORMAL

## 2019-03-04 ENCOUNTER — HOSPITAL ENCOUNTER (OUTPATIENT)
Dept: MRI IMAGING | Facility: HOSPITAL | Age: 44
Discharge: HOME | End: 2019-03-04
Admitting: NURSE PRACTITIONER

## 2019-03-04 PROCEDURE — 72148 MRI LUMBAR SPINE W/O DYE: CPT

## 2019-03-06 ENCOUNTER — TELEPHONE (OUTPATIENT)
Dept: NEUROLOGY | Facility: CLINIC | Age: 44
End: 2019-03-06

## 2019-03-06 DIAGNOSIS — M54.16 LUMBAR RADICULOPATHY: Primary | ICD-10-CM

## 2019-03-06 DIAGNOSIS — M51.36 BULGING LUMBAR DISC: ICD-10-CM

## 2019-03-06 NOTE — TELEPHONE ENCOUNTER
----- Message from STEFANY Pineda sent at 3/6/2019 12:55 PM EST -----  Patient's MRI of the lumbar spine does show arthritis changes from L4-L5 and L5-S1 as well as a small disc bulge at L4-L5.  He is also scheduled to have a nerve and muscle study soon.  We could go ahead and refer him to neurosurgery for evaluation of the findings on the MRI of the lumbar spine or we can wait until his nerve and muscle study results are available.  Please let me know which patient would like to do with proceeding forward.  This does not mean he will need surgery but is a consultation to see the best treatment moving forward. Thanks, STEFANY Cottrell

## 2019-03-06 NOTE — TELEPHONE ENCOUNTER
Pt is aware of the results below and would like to go ahead and be referred to Neurosurgery now please.

## 2019-03-11 ENCOUNTER — TELEPHONE (OUTPATIENT)
Dept: NEUROLOGY | Facility: CLINIC | Age: 44
End: 2019-03-11

## 2019-03-11 ENCOUNTER — OFFICE VISIT (OUTPATIENT)
Dept: FAMILY MEDICINE CLINIC | Facility: CLINIC | Age: 44
End: 2019-03-11

## 2019-03-11 VITALS
DIASTOLIC BLOOD PRESSURE: 78 MMHG | WEIGHT: 314 LBS | RESPIRATION RATE: 18 BRPM | BODY MASS INDEX: 39.25 KG/M2 | HEART RATE: 70 BPM | TEMPERATURE: 98.7 F | SYSTOLIC BLOOD PRESSURE: 118 MMHG

## 2019-03-11 DIAGNOSIS — M25.561 CHRONIC PAIN OF BOTH KNEES: ICD-10-CM

## 2019-03-11 DIAGNOSIS — M25.562 CHRONIC PAIN OF BOTH KNEES: ICD-10-CM

## 2019-03-11 DIAGNOSIS — G89.29 CHRONIC PAIN OF BOTH KNEES: ICD-10-CM

## 2019-03-11 DIAGNOSIS — R76.8 RHEUMATOID FACTOR POSITIVE: Primary | ICD-10-CM

## 2019-03-11 PROCEDURE — 99213 OFFICE O/P EST LOW 20 MIN: CPT | Performed by: FAMILY MEDICINE

## 2019-03-11 RX ORDER — PREDNISONE 20 MG/1
TABLET ORAL
Qty: 19 TABLET | Refills: 0 | Status: SHIPPED | OUTPATIENT
Start: 2019-03-11 | End: 2021-04-22

## 2019-03-11 NOTE — PROGRESS NOTES
Subjective   Emanuel Muir is a 43 y.o. male.   Chief Complaint   Patient presents with   • Back Pain     bilateral leg/knee x years.      History of Present Illness   He is experiencing bilateral knee pain, radiates down into his shins. This is a chronic condition.   He is a , which is physically demanding. He is having difficulty working due to the pain.   Some edema of bilateral knees present.  Range of motion is intact, however is associated with pain.  Stiffness is present in his joints in the mornings, improves with movement.  He has been treating ibuprofen, aleve, tylenol, Excedrin without significant relief.   Recently completed labs, which revealed a +RF, negative BAM.  He is scheduled to establish with rheumatology, appointment is in April 2019.    The following portions of the patient's history were reviewed and updated as appropriate: allergies, current medications, past family history, past medical history, past social history, past surgical history and problem list.    Review of Systems   Constitutional: Negative for chills and fever.   Respiratory: Negative.    Cardiovascular: Negative.    Musculoskeletal: Positive for arthralgias and joint swelling.   Skin: Negative for color change.   Neurological: Negative.        Objective   Physical Exam   Constitutional: He appears well-developed and well-nourished. No distress.   HENT:   Head: Normocephalic.   Right Ear: External ear normal.   Left Ear: External ear normal.   Nose: Nose normal.   Eyes: Conjunctivae are normal.   Cardiovascular: Normal rate, regular rhythm and normal heart sounds.   Pulmonary/Chest: Effort normal and breath sounds normal.   Musculoskeletal: He exhibits no edema.        Right knee: He exhibits normal range of motion.        Left knee: He exhibits normal range of motion.   Neurological: He is alert.   Skin: Skin is warm and dry.   Psychiatric: He has a normal mood and affect. His behavior is normal.   Nursing note and  vitals reviewed.        Assessment/Plan   Emanuel was seen today for back pain.    Diagnoses and all orders for this visit:    Rheumatoid factor positive  -     XR Knee 1 or 2 View Bilateral  -     predniSONE (DELTASONE) 20 MG tablet; Take 3 tabs po qd x 3 days, 2 tabs po qd x 3 days, 1 tab po qd x 3 days, 1/2 tab po qd x 2 days    Chronic pain of both knees  -     XR Knee 1 or 2 View Bilateral  -     predniSONE (DELTASONE) 20 MG tablet; Take 3 tabs po qd x 3 days, 2 tabs po qd x 3 days, 1 tab po qd x 3 days, 1/2 tab po qd x 2 days      X-ray bilateral knees to evaluate chronic pain.  Prednisone taper provided to improve joint pain.  Keep appointment with rheumatology as scheduled.

## 2019-03-11 NOTE — PATIENT INSTRUCTIONS
Go to the nearest ER or return to clinic if symptoms worsen, fever/chill develop      Joint Pain  Joint pain can be caused by many things. The joint can be bruised, infected, weak from aging, or sore from exercise. The pain will probably go away if you follow your doctor's instructions for home care. If your joint pain continues, more tests may be needed to help find the cause of your condition.  Follow these instructions at home:  Watch your condition for any changes. Follow these instructions as told to lessen the pain that you are feeling:  · Take medicines only as told by your doctor.  · Rest the sore joint for as long as told by your doctor. If your doctor tells you to, raise (elevate) the painful joint above the level of your heart while you are sitting or lying down.  · Do not do things that cause pain or make the pain worse.  · If told, put ice on the painful area:  ? Put ice in a plastic bag.  ? Place a towel between your skin and the bag.  ? Leave the ice on for 20 minutes, 2-3 times per day.  · Wear an elastic bandage, splint, or sling as told by your doctor. Loosen the bandage or splint if your fingers or toes lose feeling (become numb) and tingle, or if they turn cold and blue.  · Begin exercising or stretching the joint as told by your doctor. Ask your doctor what types of exercise are safe for you.  · Keep all follow-up visits as told by your doctor. This is important.    Contact a doctor if:  · Your pain gets worse and medicine does not help it.  · Your joint pain does not get better in 3 days.  · You have more bruising or swelling.  · You have a fever.  · You lose 10 pounds (4.5 kg) or more without trying.  Get help right away if:  · You are not able to move the joint.  · Your fingers or toes become numb or they turn cold and blue.  This information is not intended to replace advice given to you by your health care provider. Make sure you discuss any questions you have with your health care  provider.  Document Released: 12/06/2010 Document Revised: 05/25/2017 Document Reviewed: 09/29/2015  [employer] Interactive Patient Education © 2018 [employer]

## 2019-04-03 ENCOUNTER — OFFICE VISIT (OUTPATIENT)
Dept: NEUROSURGERY | Facility: CLINIC | Age: 44
End: 2019-04-03

## 2019-04-03 VITALS — HEIGHT: 75 IN | BODY MASS INDEX: 38.67 KG/M2 | TEMPERATURE: 97.5 F | WEIGHT: 311 LBS

## 2019-04-03 DIAGNOSIS — M51.36 DEGENERATIVE DISC DISEASE, LUMBAR: ICD-10-CM

## 2019-04-03 DIAGNOSIS — M51.26 HNP (HERNIATED NUCLEUS PULPOSUS), LUMBAR: Primary | ICD-10-CM

## 2019-04-03 DIAGNOSIS — M54.59 MECHANICAL LOW BACK PAIN: ICD-10-CM

## 2019-04-03 PROCEDURE — 99203 OFFICE O/P NEW LOW 30 MIN: CPT | Performed by: PHYSICIAN ASSISTANT

## 2019-04-03 NOTE — PROGRESS NOTES
Patient: Emanuel Muir  : 1975  GENDER: male    Primary Care Provider: Bobbi Lala DO    Requesting Provider: As above      History    Chief Complaint: Low back and left leg pain    History of Present Illness: Mr. Muir is a 43-year-old  with a PMHx significant for HTN, migraine headaches, and RA.  He presents to our service with low back and left leg pain x 10 years.  More recently, symptoms have progressed to include walking and standing intolerances.  Symptoms begin in low back and extend laterally into his left hip, continuing down the posterior aspect of his leg-terminating in his left foot.  Symptoms increase with change in position (whether transitioning from standing to kneeling etc.), standing >30 minutes, and walking >20 minutes.  There is nothing that has helped to alleviate symptom severity.  Patient denies right leg involvement, bowel or bladder dysfunction, or overt weakness.  He also is a smoker and continues to smoke 1 ppd x 30 years.  I counseled on the importance of smoking cessation, however patient is not interested in quitting at this time.  He has no other complaints.    Review of Systems   Constitutional: Positive for activity change. Negative for appetite change, chills, diaphoresis, fatigue, fever and unexpected weight change.   HENT: Positive for dental problem and rhinorrhea. Negative for congestion, drooling, ear discharge, ear pain, facial swelling, hearing loss, mouth sores, nosebleeds, postnasal drip, sinus pressure, sneezing, sore throat, tinnitus, trouble swallowing and voice change.    Eyes: Negative for photophobia, pain, discharge, redness, itching and visual disturbance.   Respiratory: Positive for apnea. Negative for cough, choking, chest tightness, shortness of breath, wheezing and stridor.    Cardiovascular: Negative for chest pain, palpitations and leg swelling.   Gastrointestinal: Negative for abdominal distention, abdominal pain, anal bleeding,  "blood in stool, constipation, diarrhea, nausea, rectal pain and vomiting.   Endocrine: Negative for cold intolerance, heat intolerance, polydipsia, polyphagia and polyuria.   Genitourinary: Negative for decreased urine volume, difficulty urinating, dysuria, enuresis, flank pain, frequency, genital sores, hematuria and urgency.   Musculoskeletal: Positive for arthralgias, back pain, myalgias and neck pain. Negative for gait problem, joint swelling and neck stiffness.   Skin: Negative for color change, pallor, rash and wound.   Allergic/Immunologic: Negative for environmental allergies, food allergies and immunocompromised state.   Neurological: Positive for numbness and headaches. Negative for dizziness, tremors, seizures, syncope, facial asymmetry, speech difficulty, weakness and light-headedness.   Hematological: Negative for adenopathy. Does not bruise/bleed easily.   Psychiatric/Behavioral: Positive for dysphoric mood. Negative for agitation, behavioral problems, confusion, decreased concentration, hallucinations, self-injury, sleep disturbance and suicidal ideas. The patient is not nervous/anxious and is not hyperactive.        The patient's past medical history, past surgical history, family history, and social history have been reviewed at length in the electronic medical record.    Physical Exam:   Temp 97.5 °F (36.4 °C) (Temporal)   Ht 190.5 cm (75\")   Wt (!) 141 kg (311 lb)   BMI 38.87 kg/m²   CONSTITUTIONAL:   - Patient is well-nourished  - Pleasant and appears stated age.  CV:   - Regular rate and rhythm on palpable radial pulse   - No murmur appreciated   PULMONARY:   - Speaking in full sentences  - No use of accessory muscles   - Breathing is non-labored   - No wheezing   MUSCULOSKELETAL:  - Back tenderness to palpation is not observed.   - ROM in back is normal.  - Straight leg raise is negative bilaterally   - James's sign is negative bilaterally   NEUROLOGICAL:  - A&Ox3  - Attention span, " language function, and cognition are intact.  - Strength is intact in the upper and lower extremities to direct testing.  - Muscle tone is normal throughout.  - Station and gait are normal.  - Sensation is intact to light touch testing with an exception to the anterior/lateral aspect of his left thigh - where it has been diminished x 4-5 years.  - Deep tendon reflexes are 1+ and symmetrical.    - Natalia's Sign is negative bilaterally.  - No clonus is elicited.  - Coordination is intact.    Medical Decision Making    Data Review:   1. MRI Lumbar Spine (3/04/19): Degenerative changes throughout.  Additionally at L4-5 and L5-S1 there is a small central disc protrusion.  However the canal is well-maintained.    2. XRAY Lumbar Spine (2/19/19): Minimal degenerative changes throughout.  No evidence of acute trauma or significant focal subluxation.    Diagnosis/Treatment Options:  1. HNP (herniated nucleus pulposus), lumbar  2. Degenerative disc disease, lumbar  3. Mechanical low back pain    - Ambulatory Referral to Physical Therapy Evaluate and treat       Follow up:    Mr. Muir is seen today with a 10-year history of low back and left leg pain, that has recently progressed to include walking/standing intolerances.  After reviewing his studies with Dr. Walker there is no current role for surgical intervention at this juncture.  As such, I have referred patient to physical therapy for the next 5-6 weeks.  He will then follow-up in the office with myself for reevaluation.  If he continues to prove symptomatic, I will refer him to Dr. Landaverde for consideration of 1-2 epidural injections.    Jodee Otero PA-C   4/3/2019   2:56 PM

## 2019-05-08 ENCOUNTER — OFFICE VISIT (OUTPATIENT)
Dept: FAMILY MEDICINE CLINIC | Facility: CLINIC | Age: 44
End: 2019-05-08

## 2019-05-08 VITALS
BODY MASS INDEX: 40.37 KG/M2 | DIASTOLIC BLOOD PRESSURE: 78 MMHG | HEART RATE: 74 BPM | TEMPERATURE: 98.2 F | RESPIRATION RATE: 16 BRPM | SYSTOLIC BLOOD PRESSURE: 122 MMHG | WEIGHT: 315 LBS

## 2019-05-08 DIAGNOSIS — M25.561 CHRONIC PAIN OF BOTH KNEES: ICD-10-CM

## 2019-05-08 DIAGNOSIS — M25.562 CHRONIC PAIN OF BOTH KNEES: ICD-10-CM

## 2019-05-08 DIAGNOSIS — G89.29 CHRONIC PAIN OF BOTH KNEES: ICD-10-CM

## 2019-05-08 DIAGNOSIS — R76.8 RHEUMATOID FACTOR POSITIVE: Primary | ICD-10-CM

## 2019-05-08 PROCEDURE — 99213 OFFICE O/P EST LOW 20 MIN: CPT | Performed by: FAMILY MEDICINE

## 2019-05-08 NOTE — PROGRESS NOTES
Subjective   Emanuel Muir is a 43 y.o. male.   Chief Complaint   Patient presents with   • Follow-up     knee pain     History of Present Illness   He continues to have bilateral knee pain. Xrays were ordered March 2019, yet to be completed.   +Rheumoatoid factor, but negative BAM. Hasn't completed additional lab evaluation to confirm RA.   Knee pain is constant, worse with over use. He is a , lots of climbing up and down ladders.   No edema.   Not established with orthopedist.   He is asking if neurontin will help joint pain. Currently treats knee pain with heat, occasional Ibuprofen despite taking Xarelto.   Prednisone hasn't improved knee pain on previous occasions.     He is following with Dr. Amin, hematologist. History of PE and elevated homocysteine.   Patient had previously reported that he had had more than one episode of unprovoked PEs (see chart note from Dec 2017). Now he tells me that it was just multiple PE at one time.   Hematology appointment tomorrow, has done additional lab evaluation since finding out that homocysteine is elevated.     The following portions of the patient's history were reviewed and updated as appropriate: allergies, current medications, past family history, past medical history, past social history, past surgical history and problem list.    Review of Systems   Constitutional: Negative for chills and fever.   Respiratory: Negative.    Cardiovascular: Negative.    Musculoskeletal: Positive for arthralgias. Negative for gait problem and joint swelling.   Skin: Negative for color change.       Objective   Physical Exam   Constitutional: He is oriented to person, place, and time. He appears well-developed and well-nourished. No distress.   HENT:   Head: Normocephalic.   Right Ear: External ear normal.   Left Ear: External ear normal.   Nose: Nose normal.   Eyes: Conjunctivae are normal.   Neck: Neck supple.   Cardiovascular: Normal rate, regular rhythm and normal  heart sounds.   Pulmonary/Chest: Effort normal and breath sounds normal.   Musculoskeletal: He exhibits no edema.        Right knee: He exhibits normal range of motion and no swelling. Tenderness found. Patellar tendon tenderness noted.        Left knee: He exhibits normal range of motion and no swelling. Tenderness found. Patellar tendon tenderness noted.   Neurological: He is alert and oriented to person, place, and time.   Skin: Skin is warm and dry.   Psychiatric: His behavior is normal.   Nursing note and vitals reviewed.        Assessment/Plan   Emanuel was seen today for follow-up.    Diagnoses and all orders for this visit:    Rheumatoid factor positive  -     Cyclic Citrul Peptide Antibody, IgG / IgA  -     Nuclear Antigen Antibody, IFA  -     Uric Acid  -     C-reactive Protein  -     Sedimentation Rate    Chronic pain of both knees  -     Cyclic Citrul Peptide Antibody, IgG / IgA  -     Nuclear Antigen Antibody, IFA  -     Uric Acid  -     C-reactive Protein  -     Sedimentation Rate  -     XR Knee 1 or 2 View Bilateral  -     Basic Metabolic Panel      Recommend that he complete xray bilateral knees. Apply cold compresses prn for pain relief.   Consider orthopedist consult.   Additional testing to evaluate +RF further.   Will wait and see if Xarelto is discontinued, keep appt with hematologist tomorrow. Consider NSAID therapy for joint pain if Xarelto is stopped.

## 2019-05-08 NOTE — PATIENT INSTRUCTIONS
Go to the nearest ER or return to clinic if symptoms worsen, fever/chill develop    Knee Pain, Adult  Many things can cause knee pain. The pain often goes away on its own with time and rest. If the pain does not go away, tests may be done to find out what is causing the pain.  Follow these instructions at home:  Activity  · Rest your knee.  · Do not do things that cause pain.  · Avoid activities where both feet leave the ground at the same time (high-impact activities). Examples are running, jumping rope, and doing jumping jacks.  General instructions  · Take medicines only as told by your doctor.  · Raise (elevate) your knee when you are resting. Make sure your knee is higher than your heart.  · Sleep with a pillow under your knee.  · If told, put ice on the knee:  ? Put ice in a plastic bag.  ? Place a towel between your skin and the bag.  ? Leave the ice on for 20 minutes, 2-3 times a day.  · Ask your doctor if you should wear an elastic knee support.  · Lose weight if you are overweight. Being overweight can make your knee hurt more.  · Do not use any tobacco products. These include cigarettes, chewing tobacco, or electronic cigarettes. If you need help quitting, ask your doctor. Smoking may slow down healing.  Contact a doctor if:  · The pain does not stop.  · The pain changes or gets worse.  · You have a fever along with knee pain.  · Your knee gives out or locks up.  · Your knee swells, and becomes worse.  Get help right away if:  · Your knee feels warm.  · You cannot move your knee.  · You have very bad knee pain.  · You have chest pain.  · You have trouble breathing.  Summary  · Many things can cause knee pain. The pain often goes away on its own with time and rest.  · Avoid activities that put stress on your knee. These include running and jumping rope.  · Get help right away if you cannot move your knee, or if your knee feels warm, or if you have trouble breathing.  This information is not intended to  replace advice given to you by your health care provider. Make sure you discuss any questions you have with your health care provider.  Document Released: 03/16/2010 Document Revised: 12/12/2017 Document Reviewed: 12/12/2017  [employer] Interactive Patient Education © 2019 [employer]

## 2019-05-10 LAB
ANA TITR SER IF: NEGATIVE {TITER}
BUN SERPL-MCNC: 15 MG/DL (ref 6–20)
BUN/CREAT SERPL: 15.3 (ref 7–25)
CALCIUM SERPL-MCNC: 9.9 MG/DL (ref 8.6–10.5)
CCP IGA+IGG SERPL IA-ACNC: 3 UNITS (ref 0–19)
CHLORIDE SERPL-SCNC: 100 MMOL/L (ref 98–107)
CO2 SERPL-SCNC: 26.5 MMOL/L (ref 22–29)
CREAT SERPL-MCNC: 0.98 MG/DL (ref 0.76–1.27)
CRP SERPL-MCNC: 0.2 MG/DL (ref 0–0.5)
ERYTHROCYTE [SEDIMENTATION RATE] IN BLOOD BY WESTERGREN METHOD: 6 MM/HR (ref 0–15)
GLUCOSE SERPL-MCNC: 81 MG/DL (ref 65–99)
POTASSIUM SERPL-SCNC: 4.3 MMOL/L (ref 3.5–5.2)
SODIUM SERPL-SCNC: 138 MMOL/L (ref 136–145)
URATE SERPL-MCNC: 5.1 MG/DL (ref 3.4–7)

## 2019-05-13 RX ORDER — HYDROXYZINE HYDROCHLORIDE 25 MG/1
TABLET, FILM COATED ORAL
Qty: 120 TABLET | Refills: 0 | Status: SHIPPED | OUTPATIENT
Start: 2019-05-13 | End: 2019-07-11 | Stop reason: SDUPTHER

## 2019-07-12 RX ORDER — HYDROXYZINE HYDROCHLORIDE 25 MG/1
TABLET, FILM COATED ORAL
Qty: 120 TABLET | Refills: 0 | Status: SHIPPED | OUTPATIENT
Start: 2019-07-12